# Patient Record
Sex: MALE | Race: ASIAN | NOT HISPANIC OR LATINO | URBAN - METROPOLITAN AREA
[De-identification: names, ages, dates, MRNs, and addresses within clinical notes are randomized per-mention and may not be internally consistent; named-entity substitution may affect disease eponyms.]

---

## 2019-12-02 ENCOUNTER — INPATIENT (INPATIENT)
Facility: HOSPITAL | Age: 64
LOS: 1 days | Discharge: HOME | DRG: 310 | End: 2019-12-04
Attending: INTERNAL MEDICINE | Admitting: INTERNAL MEDICINE
Payer: COMMERCIAL

## 2019-12-02 VITALS
RESPIRATION RATE: 18 BRPM | OXYGEN SATURATION: 97 % | DIASTOLIC BLOOD PRESSURE: 80 MMHG | TEMPERATURE: 98 F | HEART RATE: 93 BPM | SYSTOLIC BLOOD PRESSURE: 132 MMHG | HEIGHT: 66.54 IN | WEIGHT: 154.32 LBS

## 2019-12-02 DIAGNOSIS — I48.91 UNSPECIFIED ATRIAL FIBRILLATION: ICD-10-CM

## 2019-12-02 DIAGNOSIS — E78.5 HYPERLIPIDEMIA, UNSPECIFIED: ICD-10-CM

## 2019-12-02 DIAGNOSIS — I10 ESSENTIAL (PRIMARY) HYPERTENSION: ICD-10-CM

## 2019-12-02 DIAGNOSIS — E11.9 TYPE 2 DIABETES MELLITUS WITHOUT COMPLICATIONS: ICD-10-CM

## 2019-12-02 DIAGNOSIS — R51 HEADACHE: ICD-10-CM

## 2019-12-02 DIAGNOSIS — Z29.9 ENCOUNTER FOR PROPHYLACTIC MEASURES, UNSPECIFIED: ICD-10-CM

## 2019-12-02 DIAGNOSIS — R07.89 OTHER CHEST PAIN: ICD-10-CM

## 2019-12-02 LAB
ALBUMIN SERPL ELPH-MCNC: 4.6 G/DL — SIGNIFICANT CHANGE UP (ref 3.3–5)
ALP SERPL-CCNC: 74 U/L — SIGNIFICANT CHANGE UP (ref 40–120)
ALT FLD-CCNC: 32 U/L — SIGNIFICANT CHANGE UP (ref 10–45)
ANION GAP SERPL CALC-SCNC: 17 MMOL/L — SIGNIFICANT CHANGE UP (ref 5–17)
APTT BLD: 33.7 SEC — SIGNIFICANT CHANGE UP (ref 27.5–36.3)
AST SERPL-CCNC: 17 U/L — SIGNIFICANT CHANGE UP (ref 10–40)
BASOPHILS # BLD AUTO: 0.04 K/UL — SIGNIFICANT CHANGE UP (ref 0–0.2)
BASOPHILS NFR BLD AUTO: 0.3 % — SIGNIFICANT CHANGE UP (ref 0–2)
BILIRUB SERPL-MCNC: 0.5 MG/DL — SIGNIFICANT CHANGE UP (ref 0.2–1.2)
BUN SERPL-MCNC: 14 MG/DL — SIGNIFICANT CHANGE UP (ref 7–23)
CALCIUM SERPL-MCNC: 9.6 MG/DL — SIGNIFICANT CHANGE UP (ref 8.4–10.5)
CHLORIDE SERPL-SCNC: 92 MMOL/L — LOW (ref 96–108)
CO2 SERPL-SCNC: 24 MMOL/L — SIGNIFICANT CHANGE UP (ref 22–31)
CREAT SERPL-MCNC: 0.69 MG/DL — SIGNIFICANT CHANGE UP (ref 0.5–1.3)
EOSINOPHIL # BLD AUTO: 0.05 K/UL — SIGNIFICANT CHANGE UP (ref 0–0.5)
EOSINOPHIL NFR BLD AUTO: 0.4 % — SIGNIFICANT CHANGE UP (ref 0–6)
GAS PNL BLDV: SIGNIFICANT CHANGE UP
GLUCOSE SERPL-MCNC: 402 MG/DL — HIGH (ref 70–99)
HCT VFR BLD CALC: 51.9 % — HIGH (ref 39–50)
HGB BLD-MCNC: 17.4 G/DL — HIGH (ref 13–17)
IMM GRANULOCYTES NFR BLD AUTO: 0.6 % — SIGNIFICANT CHANGE UP (ref 0–1.5)
INR BLD: 0.95 RATIO — SIGNIFICANT CHANGE UP (ref 0.88–1.16)
LYMPHOCYTES # BLD AUTO: 15.4 % — SIGNIFICANT CHANGE UP (ref 13–44)
LYMPHOCYTES # BLD AUTO: 2.11 K/UL — SIGNIFICANT CHANGE UP (ref 1–3.3)
MCHC RBC-ENTMCNC: 28.6 PG — SIGNIFICANT CHANGE UP (ref 27–34)
MCHC RBC-ENTMCNC: 33.5 GM/DL — SIGNIFICANT CHANGE UP (ref 32–36)
MCV RBC AUTO: 85.2 FL — SIGNIFICANT CHANGE UP (ref 80–100)
MONOCYTES # BLD AUTO: 0.88 K/UL — SIGNIFICANT CHANGE UP (ref 0–0.9)
MONOCYTES NFR BLD AUTO: 6.4 % — SIGNIFICANT CHANGE UP (ref 2–14)
NEUTROPHILS # BLD AUTO: 10.55 K/UL — HIGH (ref 1.8–7.4)
NEUTROPHILS NFR BLD AUTO: 76.9 % — SIGNIFICANT CHANGE UP (ref 43–77)
NRBC # BLD: 0 /100 WBCS — SIGNIFICANT CHANGE UP (ref 0–0)
PLATELET # BLD AUTO: 369 K/UL — SIGNIFICANT CHANGE UP (ref 150–400)
POTASSIUM SERPL-MCNC: 4.7 MMOL/L — SIGNIFICANT CHANGE UP (ref 3.5–5.3)
POTASSIUM SERPL-SCNC: 4.7 MMOL/L — SIGNIFICANT CHANGE UP (ref 3.5–5.3)
PROT SERPL-MCNC: 7.9 G/DL — SIGNIFICANT CHANGE UP (ref 6–8.3)
PROTHROM AB SERPL-ACNC: 10.9 SEC — SIGNIFICANT CHANGE UP (ref 10–12.9)
RBC # BLD: 6.09 M/UL — HIGH (ref 4.2–5.8)
RBC # FLD: 13.2 % — SIGNIFICANT CHANGE UP (ref 10.3–14.5)
SODIUM SERPL-SCNC: 133 MMOL/L — LOW (ref 135–145)
TROPONIN T, HIGH SENSITIVITY RESULT: 15 NG/L — SIGNIFICANT CHANGE UP (ref 0–51)
TROPONIN T, HIGH SENSITIVITY RESULT: 27 NG/L — SIGNIFICANT CHANGE UP (ref 0–51)
WBC # BLD: 13.71 K/UL — HIGH (ref 3.8–10.5)
WBC # FLD AUTO: 13.71 K/UL — HIGH (ref 3.8–10.5)

## 2019-12-02 PROCEDURE — 93010 ELECTROCARDIOGRAM REPORT: CPT

## 2019-12-02 PROCEDURE — 70450 CT HEAD/BRAIN W/O DYE: CPT | Mod: 26

## 2019-12-02 PROCEDURE — 99291 CRITICAL CARE FIRST HOUR: CPT

## 2019-12-02 PROCEDURE — 71046 X-RAY EXAM CHEST 2 VIEWS: CPT | Mod: 26

## 2019-12-02 RX ORDER — DEXTROSE 50 % IN WATER 50 %
12.5 SYRINGE (ML) INTRAVENOUS ONCE
Refills: 0 | Status: DISCONTINUED | OUTPATIENT
Start: 2019-12-02 | End: 2019-12-04

## 2019-12-02 RX ORDER — RIVAROXABAN 15 MG-20MG
20 KIT ORAL
Refills: 0 | Status: DISCONTINUED | OUTPATIENT
Start: 2019-12-02 | End: 2019-12-04

## 2019-12-02 RX ORDER — DEXTROSE 50 % IN WATER 50 %
15 SYRINGE (ML) INTRAVENOUS ONCE
Refills: 0 | Status: DISCONTINUED | OUTPATIENT
Start: 2019-12-02 | End: 2019-12-04

## 2019-12-02 RX ORDER — DILTIAZEM HCL 120 MG
10 CAPSULE, EXT RELEASE 24 HR ORAL ONCE
Refills: 0 | Status: COMPLETED | OUTPATIENT
Start: 2019-12-02 | End: 2019-12-02

## 2019-12-02 RX ORDER — METOPROLOL TARTRATE 50 MG
2.5 TABLET ORAL ONCE
Refills: 0 | Status: COMPLETED | OUTPATIENT
Start: 2019-12-02 | End: 2019-12-02

## 2019-12-02 RX ORDER — DEXTROSE 50 % IN WATER 50 %
25 SYRINGE (ML) INTRAVENOUS ONCE
Refills: 0 | Status: DISCONTINUED | OUTPATIENT
Start: 2019-12-02 | End: 2019-12-04

## 2019-12-02 RX ORDER — DIGOXIN 250 MCG
0.25 TABLET ORAL ONCE
Refills: 0 | Status: COMPLETED | OUTPATIENT
Start: 2019-12-03 | End: 2019-12-03

## 2019-12-02 RX ORDER — ATORVASTATIN CALCIUM 80 MG/1
40 TABLET, FILM COATED ORAL AT BEDTIME
Refills: 0 | Status: DISCONTINUED | OUTPATIENT
Start: 2019-12-02 | End: 2019-12-04

## 2019-12-02 RX ORDER — INSULIN LISPRO 100/ML
VIAL (ML) SUBCUTANEOUS AT BEDTIME
Refills: 0 | Status: DISCONTINUED | OUTPATIENT
Start: 2019-12-02 | End: 2019-12-03

## 2019-12-02 RX ORDER — ONDANSETRON 8 MG/1
4 TABLET, FILM COATED ORAL ONCE
Refills: 0 | Status: COMPLETED | OUTPATIENT
Start: 2019-12-02 | End: 2019-12-02

## 2019-12-02 RX ORDER — SODIUM CHLORIDE 9 MG/ML
500 INJECTION INTRAMUSCULAR; INTRAVENOUS; SUBCUTANEOUS ONCE
Refills: 0 | Status: COMPLETED | OUTPATIENT
Start: 2019-12-02 | End: 2019-12-02

## 2019-12-02 RX ORDER — SODIUM CHLORIDE 9 MG/ML
1000 INJECTION, SOLUTION INTRAVENOUS
Refills: 0 | Status: DISCONTINUED | OUTPATIENT
Start: 2019-12-02 | End: 2019-12-04

## 2019-12-02 RX ORDER — PANTOPRAZOLE SODIUM 20 MG/1
40 TABLET, DELAYED RELEASE ORAL
Refills: 0 | Status: DISCONTINUED | OUTPATIENT
Start: 2019-12-02 | End: 2019-12-04

## 2019-12-02 RX ORDER — DIGOXIN 250 MCG
0.5 TABLET ORAL ONCE
Refills: 0 | Status: COMPLETED | OUTPATIENT
Start: 2019-12-02 | End: 2019-12-02

## 2019-12-02 RX ORDER — GLUCAGON INJECTION, SOLUTION 0.5 MG/.1ML
1 INJECTION, SOLUTION SUBCUTANEOUS ONCE
Refills: 0 | Status: DISCONTINUED | OUTPATIENT
Start: 2019-12-02 | End: 2019-12-04

## 2019-12-02 RX ORDER — LOSARTAN POTASSIUM 100 MG/1
100 TABLET, FILM COATED ORAL DAILY
Refills: 0 | Status: DISCONTINUED | OUTPATIENT
Start: 2019-12-02 | End: 2019-12-02

## 2019-12-02 RX ORDER — ATENOLOL 25 MG/1
50 TABLET ORAL DAILY
Refills: 0 | Status: DISCONTINUED | OUTPATIENT
Start: 2019-12-02 | End: 2019-12-04

## 2019-12-02 RX ORDER — DILTIAZEM HCL 120 MG
60 CAPSULE, EXT RELEASE 24 HR ORAL ONCE
Refills: 0 | Status: COMPLETED | OUTPATIENT
Start: 2019-12-02 | End: 2019-12-02

## 2019-12-02 RX ORDER — ASPIRIN/CALCIUM CARB/MAGNESIUM 324 MG
324 TABLET ORAL ONCE
Refills: 0 | Status: DISCONTINUED | OUTPATIENT
Start: 2019-12-02 | End: 2019-12-02

## 2019-12-02 RX ORDER — INSULIN LISPRO 100/ML
VIAL (ML) SUBCUTANEOUS
Refills: 0 | Status: DISCONTINUED | OUTPATIENT
Start: 2019-12-02 | End: 2019-12-03

## 2019-12-02 RX ADMIN — Medication 0.5 MILLIGRAM(S): at 20:38

## 2019-12-02 RX ADMIN — PANTOPRAZOLE SODIUM 40 MILLIGRAM(S): 20 TABLET, DELAYED RELEASE ORAL at 19:26

## 2019-12-02 RX ADMIN — ONDANSETRON 4 MILLIGRAM(S): 8 TABLET, FILM COATED ORAL at 18:26

## 2019-12-02 RX ADMIN — SODIUM CHLORIDE 500 MILLILITER(S): 9 INJECTION INTRAMUSCULAR; INTRAVENOUS; SUBCUTANEOUS at 18:25

## 2019-12-02 RX ADMIN — RIVAROXABAN 20 MILLIGRAM(S): KIT at 19:26

## 2019-12-02 RX ADMIN — Medication 60 MILLIGRAM(S): at 19:26

## 2019-12-02 RX ADMIN — Medication 10: at 20:46

## 2019-12-02 RX ADMIN — Medication 10 MILLIGRAM(S): at 18:26

## 2019-12-02 RX ADMIN — Medication 2.5 MILLIGRAM(S): at 17:20

## 2019-12-02 RX ADMIN — ATENOLOL 50 MILLIGRAM(S): 25 TABLET ORAL at 19:26

## 2019-12-02 NOTE — ED PROVIDER NOTE - NS ED ROS FT
Constitutional: No fever or chills  Eyes: No visual changes, eye pain or redness  HEENT: No throat pain, ear pain, nasal pain. No nose bleeding.  CV: see hpi  Resp: No SOB no cough  GI: No abd pain. No nausea or vomiting. No diarrhea. No constipation.   : No dysuria, hematuria.   MSK: No musculoskeletal pain  Skin: No rash  Neuro: see hpi

## 2019-12-02 NOTE — H&P ADULT - ASSESSMENT
Pt is a 65 y/o male with PMHx of hypertrophic cardiomyopathy S/P defibrillator, DM, HTN, HLD, afib on xarelto presenting with sharp nonradiating chest pain. found to have a fib with RVR

## 2019-12-02 NOTE — ED ADULT NURSE REASSESSMENT NOTE - NS ED NURSE REASSESS COMMENT FT1
Report received from PRIYANK Rivas . Pt AAOx4, NAD, resp nonlabored, skin warm/dry, resting comfortably in bed with family at bedside. Pt currently has no complaints. PT on cardiac monitor, noted to be A-fib . Pt denies headache, dizziness, chest pain, palpitations, SOB, abd pain, n/v/d, urinary symptoms, fevers, chills, weakness at this time. Pt awaiting bed . Safety maintained.

## 2019-12-02 NOTE — ED PROVIDER NOTE - PROGRESS NOTE DETAILS
Spoke with Dr. Mix ([facility]) who states pt was sent in for afib with avr, has known hx of afib, on xarelto, would recommedn admission tospitalist. -LETICIA ThomasC Spoke with Dr. Mix ([facility]) who states pt was sent in for afib with avr, has known hx of afib, on xarelto, would recommend admission to Dr. Peterson, may add admission , do not need to call him as he already spoke with the pt. -Hope Rasmussen PA-C Spoke with Dr. Mix ([facility]) who states pt was sent in for afib with avr, has known hx of afib, on xarelto, would recommend admission to Dr. Hannah, may add admission , do not need to call him as he already spoke with the pt. -Hope Rasmussen PA-C JAMES Mason MD: Spoke with Dr. Taylor re: patient's second troponin going from 15->27; he will address and d/w Dr. Mix

## 2019-12-02 NOTE — H&P ADULT - NSHPREVIEWOFSYSTEMS_GEN_ALL_CORE
REVIEW OF SYSTEMS:    CONSTITUTIONAL: + weakness, headache   EYES/ENT: No visual changes;  No vertigo or throat pain   NECK: No pain or stiffness  RESPIRATORY: No cough, wheezing, hemoptysis; No shortness of breath  CARDIOVASCULAR: No chest pain or palpitations  GASTROINTESTINAL: No abdominal or epigastric pain. No nausea, vomiting, or hematemesis; No diarrhea or constipation. No melena or hematochezia.  GENITOURINARY: No dysuria, frequency or hematuria  NEUROLOGICAL: No numbness or weakness  SKIN: No itching, burning, rashes, or lesions   All other review of systems is negative unless indicated above.

## 2019-12-02 NOTE — ED PROVIDER NOTE - PHYSICAL EXAMINATION
A&Ox3, NAD. NCAT. PERRL, EOMI. Neck supple, no LAD. Lungs CTAB. +S1S2, IRR, No m/r/g. Abd soft, NT/ND, +BS, no rebound or guarding. Extremities: cap refill <2, pulses in distal extremities 4+, no edema. Skin without rash. CN II-XII intact. Strength 5/5 UE/LE. Sensations intact throughout. no pronator drift, point to point intact.

## 2019-12-02 NOTE — H&P ADULT - NSHPLABSRESULTS_GEN_ALL_CORE
17.4   13.71 )-----------( 369      ( 02 Dec 2019 17:13 )             51.9               12-02    133<L>  |  92<L>  |  14  ----------------------------<  402<H>  4.7   |  24  |  0.69    Ca    9.6      02 Dec 2019 17:13    TPro  7.9  /  Alb  4.6  /  TBili  0.5  /  DBili  x   /  AST  17  /  ALT  32  /  AlkPhos  74  12-02    PT/INR - ( 02 Dec 2019 17:13 )   PT: 10.9 sec;   INR: 0.95 ratio         PTT - ( 02 Dec 2019 17:13 )  PTT:33.7 sec 17.4   13.71 )-----------( 369      ( 02 Dec 2019 17:13 )             51.9               12-02    133<L>  |  92<L>  |  14  ----------------------------<  402<H>  4.7   |  24  |  0.69    Ca    9.6      02 Dec 2019 17:13    TPro  7.9  /  Alb  4.6  /  TBili  0.5  /  DBili  x   /  AST  17  /  ALT  32  /  AlkPhos  74  12-02    PT/INR - ( 02 Dec 2019 17:13 )   PT: 10.9 sec;   INR: 0.95 ratio         PTT - ( 02 Dec 2019 17:13 )  PTT:33.7 sec    < from: CT Head No Cont (12.02.19 @ 17:47) >    IMPRESSION:    No acute intracranial hemorrhage, mass effect, vasogenic edema, or   evidence of acute territorial infarct.    A few nonspecific punctate of calcific foci in the left parietal lobe.   Findings could represent the presence of healed neurocysticercosis or be   the sequelae of prior infectious/inflammatory process.

## 2019-12-02 NOTE — H&P ADULT - PROBLEM SELECTOR PLAN 1
tele monitoring   S/P Cardizem with no improvement  DIg load with standing afterwards  monitor BP, on low side   S/P IV hydration   cont Xorelto for AC

## 2019-12-02 NOTE — H&P ADULT - PROBLEM SELECTOR PLAN 3
CT head noted  improved headache  Neuro eval for CT findings   Hx of CVA in the past  no known intracranial pathology as per pt

## 2019-12-02 NOTE — H&P ADULT - NSHPPHYSICALEXAM_GEN_ALL_CORE
Vital Signs Last 24 Hrs  T(C): 36.5 (02 Dec 2019 15:19), Max: 36.5 (02 Dec 2019 15:19)  T(F): 97.7 (02 Dec 2019 15:19), Max: 97.7 (02 Dec 2019 15:19)  HR: 103 (02 Dec 2019 17:24) (93 - 136)  BP: 105/72 (02 Dec 2019 17:24) (105/72 - 132/80)  BP(mean): --  RR: 18 (02 Dec 2019 17:24) (18 - 18)  SpO2: 97% (02 Dec 2019 17:24) (96% - 98%) Vital Signs Last 24 Hrs  T(C): 36.5 (02 Dec 2019 15:19), Max: 36.5 (02 Dec 2019 15:19)  T(F): 97.7 (02 Dec 2019 15:19), Max: 97.7 (02 Dec 2019 15:19)  HR: 103 (02 Dec 2019 17:24) (93 - 136)  BP: 105/72 (02 Dec 2019 17:24) (105/72 - 132/80)  BP(mean): --  RR: 18 (02 Dec 2019 17:24) (18 - 18)  SpO2: 97% (02 Dec 2019 17:24) (96% - 98%)    Appearance: Normal	  HEENT:   Normal oral mucosa, PERRL, EOMI	  Lymphatic: No lymphadenopathy , no edema  Cardiovascular: S1S2, irregularly irregular   Respiratory: Lungs clear to auscultation, normal effort 	  Gastrointestinal:  Soft, Non-tender, + BS	  Skin: No rashes, No ecchymoses, No cyanosis, warm to touch  Musculoskeletal: Normal range of motion, normal strength  Psychiatry:  Mood & affect appropriate  Ext: No edema

## 2019-12-02 NOTE — ED PROVIDER NOTE - OBJECTIVE STATEMENT
63 yo male with defibrillator, DM, htn, hld, presenting with sharp nonradiating chest pain since this morning which awoke him from his sleep, is constant non exertional, non pleuritic, no changes with positions. Pt endorses associated nausea and diaphoresis and shortness of breath. Pt also c/o sharp, vice-like bitemproal headache which also started this morning when he woke up this morning and has been getting worse in intensity. denies fevers, chills, changes in vision, abdominal pain, difficulty breahting. PTs daughter called pts cardiologist. Garret Yates who recommended he go to the ED, reviewed his loop recorder (per family unknown why he has it) and was told he has an arrythmia and should come to the ED. 65 yo male with defibrillator, DM, htn, hld, presenting with sharp nonradiating chest pain since this morning which awoke him from his sleep, is constant non exertional, non pleuritic, no changes with positions. Pt endorses associated nausea and diaphoresis and shortness of breath. Pt also c/o sharp, vice-like bitemproal headache which also started this morning when he woke up this morning and has been getting worse in intensity. denies fevers, chills, changes in vision, abdominal pain, difficulty breahting. PTs daughter called pts cardiologist. Gokul Yates who recommended he go to the ED, reviewed his loop recorder (per family unknown why he has it) and was told he has an arrythmia and should come to the ED.    PMD; Dr. Corby Brady--uses unattached hospitalist 63 yo male with defibrillator, DM, htn, hld, afib on xarelto presenting with sharp nonradiating chest pain since this morning which awoke him from his sleep, is constant non exertional, non pleuritic, no changes with positions. Pt endorses associated nausea and diaphoresis and shortness of breath. Pt also c/o sharp, vice-like bitemproal headache which also started this morning when he woke up this morning and has been getting worse in intensity. denies fevers, chills, changes in vision, abdominal pain, difficulty breahting. PTs daughter called pts cardiologist. Gokul Yates who recommended he go to the ED, reviewed his loop recorder (per family unknown why he has it) and was told he has an arrythmia and should come to the ED.    PMD; Dr. Corby Brady--uses unattached hospitalist

## 2019-12-02 NOTE — CONSULT NOTE ADULT - SUBJECTIVE AND OBJECTIVE BOX
CHIEF COMPLAINT:Patient is a 64y old  Male who presents with a chief complaint of Afib with RVR (02 Dec 2019 18:12)      HISTORY OF PRESENT ILLNESS:HPI:  Pt is a 65 y/o male with PMHx of hypertrophic cardiomyopathy S/P defibrillator, DM, HTN, HLD, afib on xarelto presenting with sharp nonradiating chest pain since this morning which awoke him from his sleep, is constant non exertional, non pleuritic, no changes with positions. Pt endorses associated nausea and diaphoresis and shortness of breath. Pt also c/o sharp, vice-like bitemproal headache which also started this morning when he woke up this morning and has been getting worse in intensity. denies fevers, chills, changes in vision, abdominal pain, difficulty breahting. PTs daughter called pts cardiologist. Gokul Yates who recommended he go to the ED, reviewed his loop recorder (per family unknown why he has it) and was told he has an arrythmia and should come to the ED. found to have A fib with RVR (02 Dec 2019 18:12)      PAST MEDICAL & SURGICAL HISTORY:  No pertinent past medical history  No significant past surgical history          MEDICATIONS:  ATENolol  Tablet 50 milliGRAM(s) Oral daily  rivaroxaban 20 milliGRAM(s) Oral with dinner          pantoprazole    Tablet 40 milliGRAM(s) Oral before breakfast    atorvastatin 40 milliGRAM(s) Oral at bedtime  dextrose 40% Gel 15 Gram(s) Oral once PRN  dextrose 50% Injectable 12.5 Gram(s) IV Push once  dextrose 50% Injectable 25 Gram(s) IV Push once  dextrose 50% Injectable 25 Gram(s) IV Push once  glucagon  Injectable 1 milliGRAM(s) IntraMuscular once PRN  insulin lispro (HumaLOG) corrective regimen sliding scale   SubCutaneous three times a day before meals  insulin lispro (HumaLOG) corrective regimen sliding scale   SubCutaneous at bedtime    dextrose 5%. 1000 milliLiter(s) IV Continuous <Continuous>      FAMILY HISTORY:      Non-contributory    SOCIAL HISTORY:    not a smoker    Allergies    No Known Allergies    Intolerances    	    REVIEW OF SYSTEMS:  CONSTITUTIONAL: No fever  EYES: No eye pain, visual disturbances, or discharge  ENMT:  No difficulty hearing, tinnitus  NECK: No pain or stiffness  RESPIRATORY: No cough, wheezing,  CARDIOVASCULAR: + chest pain, palpitations, no passing out, dizziness, or leg swelling  GASTROINTESTINAL:  No nausea, vomiting, diarrhea or constipation. No melena.  GENITOURINARY: No dysuria, hematuria  NEUROLOGICAL: No stroke like symptoms  SKIN: No burning or lesions   ENDOCRINE: No heat or cold intolerance  MUSCULOSKELETAL: No joint pain or swelling  PSYCHIATRIC: No  anxiety, mood swings  HEME/LYMPH: No bleeding gums  ALLERGY AND IMMUNOLOGIC: No hives or eczema	    All other ROS negative    PHYSICAL EXAM:  T(C): 36.7 (12-02-19 @ 19:28), Max: 36.7 (12-02-19 @ 19:28)  HR: 88 (12-02-19 @ 20:40) (88 - 136)  BP: 108/65 (12-02-19 @ 20:40) (103/71 - 132/80)  RR: 17 (12-02-19 @ 20:40) (17 - 18)  SpO2: 96% (12-02-19 @ 20:40) (95% - 98%)  Wt(kg): --  I&O's Summary      Appearance: Normal	  HEENT:   Normal oral mucosa, EOMI	  Cardiovascular: Normal S1 S2, No JVD, No murmurs  Respiratory: Lungs clear to auscultation	  Psychiatry: Alert  Gastrointestinal:  Soft, Non-tender, + BS	  Skin: No rashes   Neurologic: Non-focal  Extremities:  No edema  Vascular: Peripheral pulses palpable    	    	  	  CARDIAC MARKERS:  Labs personally reviewed by me                                  17.4   13.71 )-----------( 369      ( 02 Dec 2019 17:13 )             51.9     12-02    133<L>  |  92<L>  |  14  ----------------------------<  402<H>  4.7   |  24  |  0.69    Ca    9.6      02 Dec 2019 17:13    TPro  7.9  /  Alb  4.6  /  TBili  0.5  /  DBili  x   /  AST  17  /  ALT  32  /  AlkPhos  74  12-02          EKG: Personally reviewed by me - af rvr, clear lungs  Radiology: Personally reviewed by me - cxr clear lungs      Assessment and Plan:   Assessment:  · Assessment		  Pt is a 65 y/o male with PMHx of hypertrophic cardiomyopathy S/P defibrillator, DM, HTN, HLD, afib on xarelto presenting with sharp nonradiating chest pain. found to have a fib with RVR      Problem/Plan - 1:  ·  Problem: Atrial fibrillation with RVR.  Plan: tele monitoring   S/P Cardizem with no improvement  DIg load as BP soft and not able to tolerate increasing AV judy blockers  Resume home meds Atenolol and Cardizem 120mg  S/P IV hydration   cont Xorelto for AC.     Problem/Plan - 2:  ·  Problem: Chest pain, atypical.  Plan: serial CE and EKG  mild elevation in tryptonemia due to A fib with RVR  TTE    Problem/Plan - 3:  ·  Problem: Headache.  Plan: CT head noted  improved headache  Neuro eval for CT findings   Hx of CVA in the past  no known intracranial pathology as per pt.     Problem/Plan - 4:  ·  Problem: HTN (hypertension).  Plan: hold ARBs in view of low BP  may restart when HR stable   monitor vitals.     Problem/Plan - 5:  ·  Problem: HLD (hyperlipidemia).  Plan: lipid panel  statin.     Problem/Plan - 6:  Problem: Diabetes. Plan: sliding scale  hold oral mes  check A1C.    Problem/Plan - 7:  ·  Problem: Prophylactic measure.  Plan: DVT and GI PPX.       Advanced care planning was discussed with patient and family.  Advanced care planning were reviewed and discussed.  Differential diagnosis and plan of care discussed with patient after the evaluation.   Counseling on Diet, exercise, and medication compliance was done.     Girma Mix DO Located within [facility]  Cardiovascular Medicine  [phone]

## 2019-12-02 NOTE — ED PROVIDER NOTE - CLINICAL SUMMARY MEDICAL DECISION MAKING FREE TEXT BOX
JAMES Mason MD: Pt is a 63 y/o male with PMH defibrillator, DM, htn, hld, afib on xarelto who p/w c/o sharp nonradiating chest pain since this morning which awoke him from his sleep, is constant non exertional, non pleuritic, no changes with positions. Pt endorses associated nausea and diaphoresis and shortness of breath. Pt also c/o sharp, vice-like bitemproal headache which also started this morning when he woke up this morning and has been getting worse in intensity. denies fevers, chills, changes in vision, abdominal pain, difficulty breahting. PTs daughter called pts cardiologist. Gokul Yates who recommended he go to the ED, reviewed his loop recorder (per family unknown why he has it) and was told he has an arrythmia and should come to the ED. Ddx includes, however, is not limited to: ACS, MSK pain, metabolic d/o, electrolyte abnormality, other. Plan: basic labs, ekg, trop, bnp, rate control, TBA

## 2019-12-02 NOTE — H&P ADULT - HISTORY OF PRESENT ILLNESS
Pt is a 63 y/o male with PMHx of hypertrophic cardiomyopathy S/P defibrillator, DM, HTN, HLD, afib on xarelto presenting with sharp nonradiating chest pain since this morning which awoke him from his sleep, is constant non exertional, non pleuritic, no changes with positions. Pt endorses associated nausea and diaphoresis and shortness of breath. Pt also c/o sharp, vice-like bitemproal headache which also started this morning when he woke up this morning and has been getting worse in intensity. denies fevers, chills, changes in vision, abdominal pain, difficulty breahting. PTs daughter called pts cardiologist. Gokul Yates who recommended he go to the ED, reviewed his loop recorder (per family unknown why he has it) and was told he has an arrythmia and should come to the ED. found to have A fib with RVR

## 2019-12-03 LAB
ALBUMIN SERPL ELPH-MCNC: 3.9 G/DL — SIGNIFICANT CHANGE UP (ref 3.3–5)
ALP SERPL-CCNC: 60 U/L — SIGNIFICANT CHANGE UP (ref 40–120)
ALT FLD-CCNC: 28 U/L — SIGNIFICANT CHANGE UP (ref 10–45)
ANION GAP SERPL CALC-SCNC: 12 MMOL/L — SIGNIFICANT CHANGE UP (ref 5–17)
AST SERPL-CCNC: 27 U/L — SIGNIFICANT CHANGE UP (ref 10–40)
BILIRUB SERPL-MCNC: 0.3 MG/DL — SIGNIFICANT CHANGE UP (ref 0.2–1.2)
BUN SERPL-MCNC: 18 MG/DL — SIGNIFICANT CHANGE UP (ref 7–23)
CALCIUM SERPL-MCNC: 8.8 MG/DL — SIGNIFICANT CHANGE UP (ref 8.4–10.5)
CHLORIDE SERPL-SCNC: 97 MMOL/L — SIGNIFICANT CHANGE UP (ref 96–108)
CHOLEST SERPL-MCNC: 189 MG/DL — SIGNIFICANT CHANGE UP (ref 10–199)
CO2 SERPL-SCNC: 24 MMOL/L — SIGNIFICANT CHANGE UP (ref 22–31)
CREAT SERPL-MCNC: 0.65 MG/DL — SIGNIFICANT CHANGE UP (ref 0.5–1.3)
GLUCOSE SERPL-MCNC: 231 MG/DL — HIGH (ref 70–99)
HBA1C BLD-MCNC: 11.2 % — HIGH (ref 4–5.6)
HCT VFR BLD CALC: 45.7 % — SIGNIFICANT CHANGE UP (ref 39–50)
HCV AB S/CO SERPL IA: 0.34 S/CO — SIGNIFICANT CHANGE UP (ref 0–0.99)
HCV AB SERPL-IMP: SIGNIFICANT CHANGE UP
HDLC SERPL-MCNC: 27 MG/DL — LOW
HGB BLD-MCNC: 15.1 G/DL — SIGNIFICANT CHANGE UP (ref 13–17)
LIPID PNL WITH DIRECT LDL SERPL: SIGNIFICANT CHANGE UP MG/DL
MCHC RBC-ENTMCNC: 28.5 PG — SIGNIFICANT CHANGE UP (ref 27–34)
MCHC RBC-ENTMCNC: 33 GM/DL — SIGNIFICANT CHANGE UP (ref 32–36)
MCV RBC AUTO: 86.2 FL — SIGNIFICANT CHANGE UP (ref 80–100)
PLATELET # BLD AUTO: 317 K/UL — SIGNIFICANT CHANGE UP (ref 150–400)
POTASSIUM SERPL-MCNC: 4.1 MMOL/L — SIGNIFICANT CHANGE UP (ref 3.5–5.3)
POTASSIUM SERPL-SCNC: 4.1 MMOL/L — SIGNIFICANT CHANGE UP (ref 3.5–5.3)
PROT SERPL-MCNC: 6.6 G/DL — SIGNIFICANT CHANGE UP (ref 6–8.3)
RBC # BLD: 5.3 M/UL — SIGNIFICANT CHANGE UP (ref 4.2–5.8)
RBC # FLD: 13.3 % — SIGNIFICANT CHANGE UP (ref 10.3–14.5)
SODIUM SERPL-SCNC: 133 MMOL/L — LOW (ref 135–145)
TOTAL CHOLESTEROL/HDL RATIO MEASUREMENT: 7 RATIO — SIGNIFICANT CHANGE UP (ref 3.4–9.6)
TRIGL SERPL-MCNC: 694 MG/DL — HIGH (ref 10–149)
TSH SERPL-MCNC: 2.32 UIU/ML — SIGNIFICANT CHANGE UP (ref 0.27–4.2)
WBC # BLD: 10.34 K/UL — SIGNIFICANT CHANGE UP (ref 3.8–10.5)
WBC # FLD AUTO: 10.34 K/UL — SIGNIFICANT CHANGE UP (ref 3.8–10.5)

## 2019-12-03 RX ORDER — INSULIN LISPRO 100/ML
VIAL (ML) SUBCUTANEOUS
Refills: 0 | Status: DISCONTINUED | OUTPATIENT
Start: 2019-12-03 | End: 2019-12-04

## 2019-12-03 RX ORDER — INSULIN LISPRO 100/ML
VIAL (ML) SUBCUTANEOUS AT BEDTIME
Refills: 0 | Status: DISCONTINUED | OUTPATIENT
Start: 2019-12-03 | End: 2019-12-04

## 2019-12-03 RX ORDER — INFLUENZA VIRUS VACCINE 15; 15; 15; 15 UG/.5ML; UG/.5ML; UG/.5ML; UG/.5ML
0.5 SUSPENSION INTRAMUSCULAR ONCE
Refills: 0 | Status: DISCONTINUED | OUTPATIENT
Start: 2019-12-03 | End: 2019-12-04

## 2019-12-03 RX ADMIN — Medication 8: at 08:18

## 2019-12-03 RX ADMIN — Medication 0.25 MILLIGRAM(S): at 02:34

## 2019-12-03 RX ADMIN — Medication 4: at 18:00

## 2019-12-03 RX ADMIN — Medication 6: at 12:22

## 2019-12-03 RX ADMIN — PANTOPRAZOLE SODIUM 40 MILLIGRAM(S): 20 TABLET, DELAYED RELEASE ORAL at 05:58

## 2019-12-03 RX ADMIN — ATENOLOL 50 MILLIGRAM(S): 25 TABLET ORAL at 05:58

## 2019-12-03 RX ADMIN — Medication 0.25 MILLIGRAM(S): at 08:19

## 2019-12-03 RX ADMIN — ATORVASTATIN CALCIUM 40 MILLIGRAM(S): 80 TABLET, FILM COATED ORAL at 00:42

## 2019-12-03 RX ADMIN — RIVAROXABAN 20 MILLIGRAM(S): KIT at 18:01

## 2019-12-03 RX ADMIN — ATORVASTATIN CALCIUM 40 MILLIGRAM(S): 80 TABLET, FILM COATED ORAL at 21:00

## 2019-12-03 NOTE — PROGRESS NOTE ADULT - ASSESSMENT
Pt is a 63 y/o male with PMHx of hypertrophic cardiomyopathy S/P defibrillator, DM, HTN, HLD, afib on xarelto presenting with sharp nonradiating chest pain. found to have a fib with RVR

## 2019-12-03 NOTE — CONSULT NOTE ADULT - SUBJECTIVE AND OBJECTIVE BOX
[facility]), [facility]        Patient is a 64y old  Male who presents with a chief complaint of Afib with RVR (03 Dec 2019 14:38)    Excerpt from H&P,Pt is a 63 y/o male with PMHx of hypertrophic cardiomyopathy S/P defibrillator, DM, HTN, HLD, afib on xarelto presenting with sharp nonradiating chest pain since this morning which awoke him from his sleep, is constant non exertional, non pleuritic, no changes with positions. Pt endorses associated nausea and diaphoresis and shortness of breath. Pt also c/o sharp, vice-like bitemproal headache which also started this morning when he woke up this morning and has been getting worse in intensity. denies fevers, chills, changes in vision, abdominal pain, difficulty breahting. PTs daughter called pts cardiologist. Gokul Yates who recommended he go to the ED, reviewed his loop recorder (per family unknown why he has it) and was told he has an arrythmia and should come to the ED. found to have A fib with RVR (02 Dec 2019 18:12)           *****PAST MEDICAL / Surgical  HISTORY:  PAST MEDICAL & SURGICAL HISTORY:  No pertinent past medical history  No significant past surgical history           *****FAMILY HISTORY:  FAMILY HISTORY:           *****SOCIAL HISTORY:  Alcohol: None  Smoking: None         *****ALLERGIES:   Allergies    No Known Allergies    Intolerances             *****MEDICATIONS: current medication reviewed and documented.   MEDICATIONS  (STANDING):  ATENolol  Tablet 50 milliGRAM(s) Oral daily  atorvastatin 40 milliGRAM(s) Oral at bedtime  dextrose 5%. 1000 milliLiter(s) (50 mL/Hr) IV Continuous <Continuous>  dextrose 50% Injectable 12.5 Gram(s) IV Push once  dextrose 50% Injectable 25 Gram(s) IV Push once  dextrose 50% Injectable 25 Gram(s) IV Push once  influenza   Vaccine 0.5 milliLiter(s) IntraMuscular once  insulin lispro (HumaLOG) corrective regimen sliding scale   SubCutaneous three times a day before meals  insulin lispro (HumaLOG) corrective regimen sliding scale   SubCutaneous at bedtime  pantoprazole    Tablet 40 milliGRAM(s) Oral before breakfast  rivaroxaban 20 milliGRAM(s) Oral with dinner    MEDICATIONS  (PRN):  dextrose 40% Gel 15 Gram(s) Oral once PRN Blood Glucose LESS THAN 70 milliGRAM(s)/deciliter  glucagon  Injectable 1 milliGRAM(s) IntraMuscular once PRN Glucose LESS THAN 70 milligrams/deciliter           *****REVIEW OF SYSTEM:  GEN: no fever, no chills, no pain  RESP: no SOB, no cough, no sputum  CVS: no chest pain, no palpitations, no edema  GI: no abdominal pain, no nausea, no vomiting, no constipation, no diarrhea  : no dysurea, no frequency, no hematurea  Neuro: no headache, no dizziness  PSYCH: no anxiety, no depression  Derm : no itching, no rash         *****VITAL SIGNS:  T(C): 36.3 (12-03-19 @ 11:59), Max: 36.7 (12-02-19 @ 19:28)  HR: 64 (12-03-19 @ 11:59) (64 - 107)  BP: 133/84 (12-03-19 @ 11:59) (105/68 - 133/84)  RR: 18 (12-03-19 @ 11:59) (17 - 18)  SpO2: 96% (12-03-19 @ 11:59) (96% - 97%)  Wt(kg): --    12-03 @ 07:01  -  12-03 @ 18:58  --------------------------------------------------------  IN: 720 mL / OUT: 0 mL / NET: 720 mL             *****PHYSICAL EXAM:   Alert oriented x 3  Attention comprehension are fair.   Able to name, repeat, without any difficulty.   Able to follow 1 step commands.     EOMI fundi not visualized,    No facial asymmetry   Tongue is midline   Palate elevates symmetrically   Moving all 4 ext symmetrically     Gait : not assessed.  B/L down going toes               *****LAB AND IMAGING:                          15.1   10.34 )-----------( 317      ( 03 Dec 2019 09:36 )             45.7               12-03    133<L>  |  97  |  18  ----------------------------<  231<H>  4.1   |  24  |  0.65    Ca    8.8      03 Dec 2019 05:43    TPro  6.6  /  Alb  3.9  /  TBili  0.3  /  DBili  x   /  AST  27  /  ALT  28  /  AlkPhos  60  12-03    PT/INR - ( 02 Dec 2019 17:13 )   PT: 10.9 sec;   INR: 0.95 ratio         PTT - ( 02 Dec 2019 17:13 )  PTT:33.7 sec                          < from: CT Head No Cont (12.02.19 @ 17:47) >    No acute intracranial hemorrhage, mass effect, vasogenic edema, or   evidence of acute territorial infarct.    A few nonspecific punctate of calcific foci in the left parietal lobe.   Findings could represent the presence of healed neurocysticercosis or be   the sequelae of prior infectious/inflammatory process.      < end of copied text >    [All pertinent recent Imaging reports reviewed]         *****A S S E S S M E N T   A N D   P L A N :     Excerpt from H&P,Pt is a 63 y/o male with PMHx of hypertrophic cardiomyopathy S/P defibrillator, DM, HTN, HLD, afib on xarelto presenting with sharp nonradiating chest pain since this morning which awoke him from his sleep, is constant non exertional, non pleuritic, no changes with positions. Pt endorses associated nausea and diaphoresis and shortness of breath. Pt also c/o sharp, vice-like bitemproal headache which also started this morning when he woke up this morning and has been getting worse in intensity. denies fevers, chills, changes in vision, abdominal pain, difficulty breahting. PTs daughter called pts cardiologist. Gokul Yates who recommended he go to the ED, reviewed his loop recorder (per family unknown why he has it) and was told he has an arrythmia and should come to the ED. found to have A fib with RVR      Problem/Recommendations 1: head ct abn   likely indicative of a remote infection, possibly neurocysticercosis, vs. torch infection.  pt with hx of tia in 2007   no hx of seizure.  will continue to monitor        ___________________________  Will follow with you.  Thank you,  Catrachita Tolliver MD  Diplomate of the American Board of Neurology and Psychiatry.  Diplomate of the American Board of Vascular Neurology.   [facility] [place] ([facility]), [facility]   Ph: [phone]    Differential diagnosis and plan of care discussed with patient after the evaluation.   Advanced care planning options discussed.   Pain assessed and judicious use of narcotics when appropriate was discussed.  Importance of Fall prevention discussed.  Counseling on Smoking and Alcohol cessation was offered when appropriate.  Counseling on Diet, exercise, and medication compliance was done.   83 minutes spent on the total encounter;  more than 50 % of the visit was spent on counseling  and or coordinating care by the attending physician.    Thank you for allowing me to participate in the care of this anthony patient. Please do not hesitate to call me if you have any questions.     This and subsequent notes were partially created using voice recognition software and will  inherently be subject to errors including those of syntax and sound alike substitutions which may escape proofreading. In such instances original meaning may be extrapolated by contextual derivation.

## 2019-12-03 NOTE — PROGRESS NOTE ADULT - PROBLEM SELECTOR PLAN 3
CT head noted  improved headache  Neuro eval appreciated   Hx of CVA in the past  no known intracranial pathology as per pt

## 2019-12-03 NOTE — CONSULT NOTE ADULT - PROBLEM SELECTOR RECOMMENDATION 9
Will increase Humalog correction scale coverage to be moderate-scale.  Will continue monitoring FS, log, and FU.  DC plan:  -Continue home dose Janumet 50/1000 BID  -Increase Glimepiride to 4mg BID  -Close FU outpatient endo in 1-2 weeks; Patient is a candidate for insulin tx.  -Patient counseled for compliance with consistent low carb diet and exercise as tolerated outpatient. DC plan:  -Continue home dose Janumet 50/1000 BID  -Increase Glimepiride to 4mg BID  -Close FU outpatient endo in 1-2 weeks; Patient is a candidate for insulin tx.  -Patient counseled for compliance with consistent low carb diet and exercise as tolerated outpatient.

## 2019-12-03 NOTE — CONSULT NOTE ADULT - SUBJECTIVE AND OBJECTIVE BOX
HPI:  Pt is a 65 y/o male with PMHx of hypertrophic cardiomyopathy S/P defibrillator, DM, HTN, HLD, afib on xarelto presenting with sharp nonradiating chest pain since this morning which awoke him from his sleep, is constant non exertional, non pleuritic, no changes with positions. Pt endorses associated nausea and diaphoresis and shortness of breath. Pt also c/o sharp, vice-like bitemproal headache which also started this morning when he woke up this morning and has been getting worse in intensity. denies fevers, chills, changes in vision, abdominal pain, difficulty breahting. PTs daughter called pts cardiologist. Gokul Yates who recommended he go to the ED, reviewed his loop recorder (per family unknown why he has it) and was told he has an arrythmia and should come to the ED. found to have A fib with RVR (02 Dec 2019 18:12)    Patient speaks Setswana;  service used #[id number]  Patient has history of diabetes, A1C 11.2%, on oral meds at home, patient and wife are unsure of medications and doses. As per chart: Glimepiride 2mg BID, Janumet 50/1000 BID. Patient does not check his blood sugars at home. Patient follows up with PCP for diabetes management.  Endo was consulted for glycemic control/input prior to discharge.    PAST MEDICAL & SURGICAL HISTORY:  No pertinent past medical history  No significant past surgical history      FAMILY HISTORY:      Social History:    Outpatient Medications:    MEDICATIONS  (STANDING):  ATENolol  Tablet 50 milliGRAM(s) Oral daily  atorvastatin 40 milliGRAM(s) Oral at bedtime  dextrose 5%. 1000 milliLiter(s) (50 mL/Hr) IV Continuous <Continuous>  dextrose 50% Injectable 12.5 Gram(s) IV Push once  dextrose 50% Injectable 25 Gram(s) IV Push once  dextrose 50% Injectable 25 Gram(s) IV Push once  influenza   Vaccine 0.5 milliLiter(s) IntraMuscular once  insulin lispro (HumaLOG) corrective regimen sliding scale   SubCutaneous at bedtime  insulin lispro (HumaLOG) corrective regimen sliding scale   SubCutaneous three times a day before meals  pantoprazole    Tablet 40 milliGRAM(s) Oral before breakfast  rivaroxaban 20 milliGRAM(s) Oral with dinner    MEDICATIONS  (PRN):  dextrose 40% Gel 15 Gram(s) Oral once PRN Blood Glucose LESS THAN 70 milliGRAM(s)/deciliter  glucagon  Injectable 1 milliGRAM(s) IntraMuscular once PRN Glucose LESS THAN 70 milligrams/deciliter      Allergies    No Known Allergies    Intolerances      Review of Systems:  Constitutional: No fever, no chills  Eyes: No blurry vision  Neuro: No tremors  HEENT: No pain, no neck swelling  Cardiovascular: No chest pain, no palpitations  Respiratory: Has SOB, no cough  GI: No nausea, vomiting, abdominal pain  : No dysuria  Skin: no rash  MSK: Has leg swelling.  Psych: no depression  Endocrine: no polyuria, polydipsia    ALL OTHER SYSTEMS REVIEWED AND NEGATIVE    UNABLE TO OBTAIN    PHYSICAL EXAM:  VITALS: T(C): 36.3 (12-03-19 @ 11:59)  T(F): 97.4 (12-03-19 @ 11:59), Max: 98.1 (12-02-19 @ 19:28)  HR: 64 (12-03-19 @ 11:59) (64 - 136)  BP: 133/84 (12-03-19 @ 11:59) (103/71 - 133/84)  RR:  (17 - 18)  SpO2:  (95% - 98%)  Wt(kg): --  GENERAL: NAD, well-groomed, well-developed  EYES: No proptosis, no lid lag  HEENT:  Atraumatic, Normocephalic  THYROID: Normal size, no palpable nodules  RESPIRATORY: Clear to auscultation bilaterally; No rales, rhonchi, wheezing  CARDIOVASCULAR: Si S2, No murmurs;  GI: Soft, non distended, normal bowel sounds  SKIN: Dry, intact, No rashes or lesions  MUSCULOSKELETAL: Has BL lower extremity edema.  NEURO:  no tremor, sensation decreased in feet BL,    POCT Blood Glucose.: 270 mg/dL (12-03-19 @ 12:01)  POCT Blood Glucose.: 345 mg/dL (12-03-19 @ 08:08)  POCT Blood Glucose.: 367 mg/dL (12-02-19 @ 20:45)                            15.1   10.34 )-----------( 317      ( 03 Dec 2019 09:36 )             45.7       12-03    133<L>  |  97  |  18  ----------------------------<  231<H>  4.1   |  24  |  0.65    EGFR if : 121  EGFR if non : 104    Ca    8.8      12-03    TPro  6.6  /  Alb  3.9  /  TBili  0.3  /  DBili  x   /  AST  27  /  ALT  28  /  AlkPhos  60  12-03      Thyroid Function Tests:  12-03 @ 09:10 TSH 2.32 FreeT4 -- T3 -- Anti TPO -- Anti Thyroglobulin Ab -- TSI --      Hemoglobin A1C, Whole Blood: 11.2 % <H> [4.0 - 5.6] (12-03-19 @ 09:36)      12-03 Chol 189 LDL Unable to calculate Test Repeated  LDL Cholesterol (mg/dL) --- Interpretive Comment (for adults 18 and over)  Optimal LDL Level may vary based on clinical situation  Below 70                   Ideal for people at very high risk of heart  disease  Below 100                  Ideal for people at risk of heart disease  100 - 129                    [place]  130 - 159                    Borderline high  160 - 189                    High  190 and Above           Very high HDL 27<L> Trig 694<H>    Radiology: HPI:  Pt is a 63 y/o male with PMHx of hypertrophic cardiomyopathy S/P defibrillator, DM, HTN, HLD, afib on xarelto presenting with sharp nonradiating chest pain since this morning which awoke him from his sleep, is constant non exertional, non pleuritic, no changes with positions. Pt endorses associated nausea and diaphoresis and shortness of breath. Pt also c/o sharp, vice-like bitemproal headache which also started this morning when he woke up this morning and has been getting worse in intensity. denies fevers, chills, changes in vision, abdominal pain, difficulty breahting. PTs daughter called pts cardiologist. Gokul Yates who recommended he go to the ED, reviewed his loop recorder (per family unknown why he has it) and was told he has an arrythmia and should come to the ED. found to have A fib with RVR (02 Dec 2019 18:12)    Patient speaks English;  service used #[id number]  Patient has history of diabetes, A1C 11.2%, on oral meds at home, patient and wife are unsure of medications and doses. As per chart: Glimepiride 2mg BID, Janumet 50/1000 BID. Patient does not check his blood sugars at home. Patient follows up with PCP for diabetes management.  Endo was consulted for glycemic control/input prior to discharge.    PAST MEDICAL & SURGICAL HISTORY:  No pertinent past medical history  No significant past surgical history      FAMILY HISTORY:      Social History:    Outpatient Medications:    MEDICATIONS  (STANDING):  ATENolol  Tablet 50 milliGRAM(s) Oral daily  atorvastatin 40 milliGRAM(s) Oral at bedtime  dextrose 5%. 1000 milliLiter(s) (50 mL/Hr) IV Continuous <Continuous>  dextrose 50% Injectable 12.5 Gram(s) IV Push once  dextrose 50% Injectable 25 Gram(s) IV Push once  dextrose 50% Injectable 25 Gram(s) IV Push once  influenza   Vaccine 0.5 milliLiter(s) IntraMuscular once  insulin lispro (HumaLOG) corrective regimen sliding scale   SubCutaneous at bedtime  insulin lispro (HumaLOG) corrective regimen sliding scale   SubCutaneous three times a day before meals  pantoprazole    Tablet 40 milliGRAM(s) Oral before breakfast  rivaroxaban 20 milliGRAM(s) Oral with dinner    MEDICATIONS  (PRN):  dextrose 40% Gel 15 Gram(s) Oral once PRN Blood Glucose LESS THAN 70 milliGRAM(s)/deciliter  glucagon  Injectable 1 milliGRAM(s) IntraMuscular once PRN Glucose LESS THAN 70 milligrams/deciliter      Allergies    No Known Allergies    Intolerances      Review of Systems:  Constitutional: No fever, no chills  Eyes: No blurry vision  Neuro: No tremors  HEENT: No pain, no neck swelling  Cardiovascular: No chest pain, no palpitations  Respiratory: Has SOB, no cough  GI: No nausea, vomiting, abdominal pain  : No dysuria  Skin: no rash  MSK: Has leg swelling.  Psych: no depression  Endocrine: no polyuria, polydipsia    ALL OTHER SYSTEMS REVIEWED AND NEGATIVE    UNABLE TO OBTAIN    PHYSICAL EXAM:  VITALS: T(C): 36.3 (12-03-19 @ 11:59)  T(F): 97.4 (12-03-19 @ 11:59), Max: 98.1 (12-02-19 @ 19:28)  HR: 64 (12-03-19 @ 11:59) (64 - 136)  BP: 133/84 (12-03-19 @ 11:59) (103/71 - 133/84)  RR:  (17 - 18)  SpO2:  (95% - 98%)  Wt(kg): --  GENERAL: NAD, well-groomed, well-developed  EYES: No proptosis, no lid lag  HEENT:  Atraumatic, Normocephalic  THYROID: Normal size, no palpable nodules  RESPIRATORY: Clear to auscultation bilaterally; No rales, rhonchi, wheezing  CARDIOVASCULAR: Si S2, No murmurs;  GI: Soft, non distended, normal bowel sounds  SKIN: Dry, intact, No rashes or lesions  MUSCULOSKELETAL: Has BL lower extremity edema.  NEURO:  no tremor, sensation decreased in feet BL,    POCT Blood Glucose.: 270 mg/dL (12-03-19 @ 12:01)  POCT Blood Glucose.: 345 mg/dL (12-03-19 @ 08:08)  POCT Blood Glucose.: 367 mg/dL (12-02-19 @ 20:45)                            15.1   10.34 )-----------( 317      ( 03 Dec 2019 09:36 )             45.7       12-03    133<L>  |  97  |  18  ----------------------------<  231<H>  4.1   |  24  |  0.65    EGFR if : 121  EGFR if non : 104    Ca    8.8      12-03    TPro  6.6  /  Alb  3.9  /  TBili  0.3  /  DBili  x   /  AST  27  /  ALT  28  /  AlkPhos  60  12-03      Thyroid Function Tests:  12-03 @ 09:10 TSH 2.32 FreeT4 -- T3 -- Anti TPO -- Anti Thyroglobulin Ab -- TSI --      Hemoglobin A1C, Whole Blood: 11.2 % <H> [4.0 - 5.6] (12-03-19 @ 09:36)      12-03 Chol 189 LDL Unable to calculate Test Repeated  LDL Cholesterol (mg/dL) --- Interpretive Comment (for adults 18 and over)  Optimal LDL Level may vary based on clinical situation  Below 70                   Ideal for people at very high risk of heart  disease  Below 100                  Ideal for people at risk of heart disease  100 - 129                    [place]  130 - 159                    Borderline high  160 - 189                    High  190 and Above           Very high HDL 27<L> Trig 694<H>    Radiology:

## 2019-12-03 NOTE — CONSULT NOTE ADULT - ASSESSMENT
Patient speaks Yoruba;  service used #[id number]  Assessment  DMT2: 64y Male with DM T2 with hyperglycemia, A1C 11.2%, was on oral meds at home (Glimepiride 2mg BID, Janumet 50/1000 BID), now on insulin coverage, blood sugars running high and not at target (, 270), no hypoglycemic episode, eating meals, appears comfortable, wife by the bedside.  A.fib: on medications, stable, monitored.  HTN: Controlled,  on antihypertensive medications.  HLD: Controlled, on statin.          Jack Britton MD  Cell: [phone]  Office: [phone] Patient speaks Romansh;  service used #[id number]  Assessment  DMT2: 64y Male with DM T2 with hyperglycemia, A1C 11.2%, was on oral meds at home (Glimepiride 2mg BID, Janumet 50/1000 BID), now on insulin coverage, blood sugars running high and not at target (, 270), no hypoglycemic episode, eating meals, appears comfortable, wife by the bedside.  A.fib: on medications,  stable, monitored.  HTN: Controlled,  on antihypertensive medications.  HLD: Controlled, on statin.          Jack Britton MD  Cell: [phone]  Office: [phone]

## 2019-12-03 NOTE — PROGRESS NOTE ADULT - PROBLEM SELECTOR PLAN 1
tele monitoring   S/P Cardizem with no improvement  DIg load given overnight, now in SR  S/P IV hydration   cont Xorelto for AC  Echo  close F/U with outpatient cardiologist

## 2019-12-03 NOTE — PROGRESS NOTE ADULT - SUBJECTIVE AND OBJECTIVE BOX
Patient is a 64y old  Male who presents with a chief complaint of Afib with RVR (03 Dec 2019 14:38)      INTERVAL HISTORY: feels ok    TELEMETRY: converted to SR  	  MEDICATIONS:  ATENolol  Tablet 50 milliGRAM(s) Oral daily        PHYSICAL EXAM:  T(C): 37 (12-03-19 @ 19:58), Max: 37 (12-03-19 @ 19:58)  HR: 70 (12-03-19 @ 19:58) (64 - 70)  BP: 134/77 (12-03-19 @ 19:58) (126/73 - 134/77)  RR: 18 (12-03-19 @ 19:58) (18 - 18)  SpO2: 95% (12-03-19 @ 19:58) (95% - 97%)  Wt(kg): --  I&O's Summary    03 Dec 2019 07:01  -  04 Dec 2019 00:22  --------------------------------------------------------  IN: 840 mL / OUT: 0 mL / NET: 840 mL          Appearance: In no distress	  HEENT:    PERRL, EOMI	  Cardiovascular:  S1 S2, No JVD  Respiratory: Lungs clear to auscultation	  Gastrointestinal:  Soft, Non-tender, + BS	  Extremities:  No edema of LE                                15.1   10.34 )-----------( 317      ( 03 Dec 2019 09:36 )             45.7     12-03    133<L>  |  97  |  18  ----------------------------<  231<H>  4.1   |  24  |  0.65    Ca    8.8      03 Dec 2019 05:43    TPro  6.6  /  Alb  3.9  /  TBili  0.3  /  DBili  x   /  AST  27  /  ALT  28  /  AlkPhos  60  12-03        Labs personally reviewed      Assessment and Plan:   Assessment:  · Assessment		  Pt is a 63 y/o male with PMHx of hypertrophic cardiomyopathy S/P defibrillator, DM, HTN, HLD, afib on xarelto presenting with sharp nonradiating chest pain. found to have a fib with RVR      Problem/Plan - 1:  ·  Problem: Atrial fibrillation with RVR.  Plan: tele monitoring   S/P Cardizem with no improvement  spontaneous conversion to SR  cont Xorelto for AC.     Problem/Plan - 2:  ·  Problem: Chest pain, atypical.  Plan:   mild elevation in tryptonemia due to A fib with RVR  TTE pending    Problem/Plan - 3:  ·  Problem: HTN (hypertension).  Plan: hold ARBs in view of low BP  BP ok off ARB    Problem/Plan - 4:  ·  Problem: HLD (hyperlipidemia).  Plan: lipid panel  statin.       Girma Mix DO [facility]  Cardiovascular Medicine  [phone]

## 2019-12-03 NOTE — PROGRESS NOTE ADULT - SUBJECTIVE AND OBJECTIVE BOX
[facility]    Subjective: Patient seen and examined. No new events except as noted.   feeling better  converted to sinus this AM     REVIEW OF SYSTEMS:    CONSTITUTIONAL: No weakness, fevers or chills  EYES/ENT: No visual changes;  No vertigo or throat pain   NECK: No pain or stiffness  RESPIRATORY: No cough, wheezing, hemoptysis; No shortness of breath  CARDIOVASCULAR: No chest pain or palpitations  GASTROINTESTINAL: No abdominal or epigastric pain. No nausea, vomiting, or hematemesis; No diarrhea or constipation. No melena or hematochezia.  GENITOURINARY: No dysuria, frequency or hematuria  NEUROLOGICAL: No numbness or weakness  SKIN: No itching, burning, rashes, or lesions   All other review of systems is negative unless indicated above.    MEDICATIONS:  MEDICATIONS  (STANDING):  ATENolol  Tablet 50 milliGRAM(s) Oral daily  atorvastatin 40 milliGRAM(s) Oral at bedtime  dextrose 5%. 1000 milliLiter(s) (50 mL/Hr) IV Continuous <Continuous>  dextrose 50% Injectable 12.5 Gram(s) IV Push once  dextrose 50% Injectable 25 Gram(s) IV Push once  dextrose 50% Injectable 25 Gram(s) IV Push once  influenza   Vaccine 0.5 milliLiter(s) IntraMuscular once  insulin lispro (HumaLOG) corrective regimen sliding scale   SubCutaneous three times a day before meals  insulin lispro (HumaLOG) corrective regimen sliding scale   SubCutaneous at bedtime  pantoprazole    Tablet 40 milliGRAM(s) Oral before breakfast  rivaroxaban 20 milliGRAM(s) Oral with dinner      PHYSICAL EXAM:  T(C): 36.3 (12-03-19 @ 11:59), Max: 36.7 (12-02-19 @ 19:28)  HR: 64 (12-03-19 @ 11:59) (64 - 136)  BP: 133/84 (12-03-19 @ 11:59) (103/71 - 133/84)  RR: 18 (12-03-19 @ 11:59) (17 - 18)  SpO2: 96% (12-03-19 @ 11:59) (95% - 98%)  Wt(kg): --  I&O's Summary    Height (cm): 169 (12-02 @ 15:19)  Weight (kg): 70 (12-02 @ 15:19)  BMI (kg/m2): 24.5 (12-02 @ 15:19)  BSA (m2): 1.8 (12-02 @ 15:19)    Appearance: Normal	  HEENT:   Normal oral mucosa, PERRL, EOMI	  Lymphatic: No lymphadenopathy , no edema  Cardiovascular: Normal S1 S2, No JVD, No murmurs , Peripheral pulses palpable 2+ bilaterally  Respiratory: Lungs clear to auscultation, normal effort 	  Gastrointestinal:  Soft, Non-tender, + BS	  Skin: No rashes, No ecchymoses, No cyanosis, warm to touch  Musculoskeletal: Normal range of motion, normal strength  Psychiatry:  Mood & affect appropriate  Ext: No edema      All labs, Imaging and EKGs personally reviewed                             15.1   10.34 )-----------( 317      ( 03 Dec 2019 09:36 )             45.7               12-03    133<L>  |  97  |  18  ----------------------------<  231<H>  4.1   |  24  |  0.65    Ca    8.8      03 Dec 2019 05:43    TPro  6.6  /  Alb  3.9  /  TBili  0.3  /  DBili  x   /  AST  27  /  ALT  28  /  AlkPhos  60  12-03    PT/INR - ( 02 Dec 2019 17:13 )   PT: 10.9 sec;   INR: 0.95 ratio         PTT - ( 02 Dec 2019 17:13 )  PTT:33.7 sec

## 2019-12-03 NOTE — CONSULT NOTE ADULT - ATTENDING COMMENTS
Will increase Humalog correction scale coverage to be moderate-scale.  Will continue monitoring FS, log, and FU.

## 2019-12-04 ENCOUNTER — TRANSCRIPTION ENCOUNTER (OUTPATIENT)
Age: 64
End: 2019-12-04

## 2019-12-04 VITALS
HEART RATE: 64 BPM | DIASTOLIC BLOOD PRESSURE: 83 MMHG | RESPIRATION RATE: 18 BRPM | TEMPERATURE: 98 F | SYSTOLIC BLOOD PRESSURE: 144 MMHG | OXYGEN SATURATION: 94 %

## 2019-12-04 PROCEDURE — 71046 X-RAY EXAM CHEST 2 VIEWS: CPT

## 2019-12-04 PROCEDURE — 70450 CT HEAD/BRAIN W/O DYE: CPT

## 2019-12-04 PROCEDURE — 84132 ASSAY OF SERUM POTASSIUM: CPT

## 2019-12-04 PROCEDURE — 83880 ASSAY OF NATRIURETIC PEPTIDE: CPT

## 2019-12-04 PROCEDURE — 85610 PROTHROMBIN TIME: CPT

## 2019-12-04 PROCEDURE — 83036 HEMOGLOBIN GLYCOSYLATED A1C: CPT

## 2019-12-04 PROCEDURE — 85027 COMPLETE CBC AUTOMATED: CPT

## 2019-12-04 PROCEDURE — 82330 ASSAY OF CALCIUM: CPT

## 2019-12-04 PROCEDURE — 85730 THROMBOPLASTIN TIME PARTIAL: CPT

## 2019-12-04 PROCEDURE — 82435 ASSAY OF BLOOD CHLORIDE: CPT

## 2019-12-04 PROCEDURE — 84443 ASSAY THYROID STIM HORMONE: CPT

## 2019-12-04 PROCEDURE — 82803 BLOOD GASES ANY COMBINATION: CPT

## 2019-12-04 PROCEDURE — C8929: CPT

## 2019-12-04 PROCEDURE — 80053 COMPREHEN METABOLIC PANEL: CPT

## 2019-12-04 PROCEDURE — 93356 MYOCRD STRAIN IMG SPCKL TRCK: CPT

## 2019-12-04 PROCEDURE — 84484 ASSAY OF TROPONIN QUANT: CPT

## 2019-12-04 PROCEDURE — 82962 GLUCOSE BLOOD TEST: CPT

## 2019-12-04 PROCEDURE — 82947 ASSAY GLUCOSE BLOOD QUANT: CPT

## 2019-12-04 PROCEDURE — 0399T: CPT

## 2019-12-04 PROCEDURE — 85014 HEMATOCRIT: CPT

## 2019-12-04 PROCEDURE — 80061 LIPID PANEL: CPT

## 2019-12-04 PROCEDURE — 84295 ASSAY OF SERUM SODIUM: CPT

## 2019-12-04 PROCEDURE — 99291 CRITICAL CARE FIRST HOUR: CPT | Mod: 25

## 2019-12-04 PROCEDURE — 93306 TTE W/DOPPLER COMPLETE: CPT | Mod: 26

## 2019-12-04 PROCEDURE — 86803 HEPATITIS C AB TEST: CPT

## 2019-12-04 PROCEDURE — 83605 ASSAY OF LACTIC ACID: CPT

## 2019-12-04 PROCEDURE — 93005 ELECTROCARDIOGRAM TRACING: CPT

## 2019-12-04 PROCEDURE — 96374 THER/PROPH/DIAG INJ IV PUSH: CPT

## 2019-12-04 RX ORDER — GLIMEPIRIDE 1 MG
1 TABLET ORAL
Qty: 0 | Refills: 0 | DISCHARGE

## 2019-12-04 RX ORDER — OMEPRAZOLE 10 MG/1
1 CAPSULE, DELAYED RELEASE ORAL
Qty: 0 | Refills: 0 | DISCHARGE

## 2019-12-04 RX ORDER — PANTOPRAZOLE SODIUM 20 MG/1
1 TABLET, DELAYED RELEASE ORAL
Qty: 0 | Refills: 0 | DISCHARGE
Start: 2019-12-04

## 2019-12-04 RX ORDER — GLIMEPIRIDE 1 MG
2 TABLET ORAL
Qty: 120 | Refills: 0
Start: 2019-12-04 | End: 2020-01-02

## 2019-12-04 RX ORDER — INSULIN GLARGINE 100 [IU]/ML
12 INJECTION, SOLUTION SUBCUTANEOUS AT BEDTIME
Refills: 0 | Status: DISCONTINUED | OUTPATIENT
Start: 2019-12-04 | End: 2019-12-04

## 2019-12-04 RX ORDER — ATENOLOL 25 MG/1
1 TABLET ORAL
Qty: 0 | Refills: 0 | DISCHARGE

## 2019-12-04 RX ORDER — GLIMEPIRIDE 1 MG
2 TABLET ORAL
Qty: 0 | Refills: 0 | DISCHARGE

## 2019-12-04 RX ORDER — INSULIN LISPRO 100/ML
6 VIAL (ML) SUBCUTANEOUS
Refills: 0 | Status: DISCONTINUED | OUTPATIENT
Start: 2019-12-04 | End: 2019-12-04

## 2019-12-04 RX ADMIN — PANTOPRAZOLE SODIUM 40 MILLIGRAM(S): 20 TABLET, DELAYED RELEASE ORAL at 05:09

## 2019-12-04 RX ADMIN — ATENOLOL 50 MILLIGRAM(S): 25 TABLET ORAL at 05:09

## 2019-12-04 RX ADMIN — Medication 8: at 12:32

## 2019-12-04 RX ADMIN — Medication 4: at 09:05

## 2019-12-04 NOTE — DISCHARGE NOTE NURSING/CASE MANAGEMENT/SOCIAL WORK - PATIENT PORTAL LINK FT
You can access the FollowMyHealth Patient Portal offered by [facility] by registering at the following website: [email or web address]. By joining [employer]’s FollowMyHealth portal, you will also be able to view your health information using other applications (apps) compatible with our system.

## 2019-12-04 NOTE — PROGRESS NOTE ADULT - ATTENDING COMMENTS
DC plan:  -Continue home dose Janumet 50/1000 BID  -Increase Glimepiride to 4mg BID  -Close FU outpatient endo in 1-2 weeks; Patient is a candidate for insulin tx.  -Patient counseled for compliance with consistent low carb diet and exercise as tolerated outpatient.
D/C home after echo with close PMD and cardiology F/U

## 2019-12-04 NOTE — CHART NOTE - NSCHARTNOTEFT_GEN_A_CORE
Medically cleared for discharge by Dr. Taylor and Dr. Mix. Continue current medications, increase glimeperide as per Endo. Restart irbesartan and verapamil as per Dr. Taylor. Follow up with Dr. Shearer in office in 2 weeks. Instructions reviewed with patient with  phone

## 2019-12-04 NOTE — DISCHARGE NOTE PROVIDER - NSDCMRMEDTOKEN_GEN_ALL_CORE_FT
atenolol 50 mg oral tablet: 1 tab(s) orally 2 times a day  glimepiride 2 mg oral tablet: 1 tab(s) orally 2 times a day  irbesartan 300 mg oral tablet: 1 tab(s) orally once a day  Janumet 50 mg-1000 mg oral tablet: 1 tab(s) orally 2 times a day  loratadine 10 mg oral tablet: 1 tab(s) orally once a day, As Needed  omeprazole 40 mg oral delayed release capsule: 1 cap(s) orally once a day  pantoprazole 40 mg oral delayed release tablet: 1 tab(s) orally once a day (before a meal)  pravastatin 40 mg oral tablet: 1 tab(s) orally once a day  verapamil 120 mg/24 hours oral capsule, extended release: 1 cap(s) orally once a day  Xarelto 20 mg oral tablet: 1 tab(s) orally once a day (in the evening) atenolol 50 mg oral tablet: 1 tab(s) orally once a day  glimepiride 2 mg oral tablet: 2 tab(s) orally 2 times a day  irbesartan 300 mg oral tablet: 1 tab(s) orally once a day  Janumet 50 mg-1000 mg oral tablet: 1 tab(s) orally 2 times a day  loratadine 10 mg oral tablet: 1 tab(s) orally once a day, As Needed  omeprazole 40 mg oral delayed release capsule: 1 cap(s) orally once a day  pantoprazole 40 mg oral delayed release tablet: 1 tab(s) orally once a day (before a meal)  pravastatin 40 mg oral tablet: 1 tab(s) orally once a day  verapamil 120 mg/24 hours oral capsule, extended release: 1 cap(s) orally once a day  Xarelto 20 mg oral tablet: 1 tab(s) orally once a day (in the evening) atenolol 50 mg oral tablet: 1 tab(s) orally once a day  glimepiride 2 mg oral tablet: 2 tab(s) orally 2 times a day  irbesartan 300 mg oral tablet: 1 tab(s) orally once a day  Janumet 50 mg-1000 mg oral tablet: 1 tab(s) orally 2 times a day  loratadine 10 mg oral tablet: 1 tab(s) orally once a day, As Needed  pantoprazole 40 mg oral delayed release tablet: 1 tab(s) orally once a day (before a meal)  pravastatin 40 mg oral tablet: 1 tab(s) orally once a day  verapamil 120 mg/24 hours oral capsule, extended release: 1 cap(s) orally once a day  Xarelto 20 mg oral tablet: 1 tab(s) orally once a day (in the evening)

## 2019-12-04 NOTE — PROGRESS NOTE ADULT - PROBLEM SELECTOR PLAN 1
Will start patient on basal bolus insulin: Lantus 12u at bedtime, Humalog 6u before each meal, and continue sliding scale. Will continue monitoring FS, log, and FU.  DC plan:  -Continue home dose Janumet 50/1000 BID  -Increase Glimepiride to 4mg BID  -Close FU outpatient endo in 1-2 weeks; Patient is a candidate for insulin tx.  -Patient counseled for compliance with consistent low carb diet and exercise as tolerated outpatient. Will start patient on basal bolus insulin: Lantus 12u at bedtime, Humalog 6u before each meal, and continue sliding scale. Will continue monitoring FS, log, and FU.

## 2019-12-04 NOTE — DISCHARGE NOTE PROVIDER - NSDCCPCAREPLAN_GEN_ALL_CORE_FT
PRINCIPAL DISCHARGE DIAGNOSIS  Diagnosis: Atypical chest pain  Assessment and Plan of Treatment:   HOME CARE INSTRUCTIONS  For the next few days, avoid physical activities that bring on chest pain. Continue physical activities as directed.  Do not smoke.  Avoid drinking alcohol.   Only take over-the-counter or prescription medicine for pain, discomfort, or fever as directed by your caregiver.  Follow your caregiver's suggestions for further testing if your chest pain does not go away.  Keep any follow-up appointments you made. If you do not go to an appointment, you could develop lasting (chronic) problems with pain. If there is any problem keeping an appointment, you must call to reschedule.   SEEK MEDICAL CARE IF:  You think you are having problems from the medicine you are taking. Read your medicine instructions carefully.  Your chest pain does not go away, even after treatment.  You develop a rash with blisters on your chest.  SEEK IMMEDIATE MEDICAL CARE IF:  You have increased chest pain or pain that spreads to your arm, neck, jaw, back, or abdomen.   You develop shortness of breath, an increasing cough, or you are coughing up blood.  You have severe back or abdominal pain, feel nauseous, or vomit.  You develop severe weakness, fainting, or chills.  You have a fever.        SECONDARY DISCHARGE DIAGNOSES  Diagnosis: Abnormal head CT  Assessment and Plan of Treatment: A few nonspecific punctate of calcific foci in the left parietal lobe. Follow up with Dr. Tolliver for monitoring    Diagnosis: Atrial fibrillation  Assessment and Plan of Treatment: Atrial fibrillation is the most common heart rhythm problem.  The condition puts you at risk for has stroke and heart attack  It helps if you control your blood pressure, not drink more than 1-2 alcohol drinks per day, cut down on caffeine, getting treatment for over active thyroid gland, and get regular exercise  Call your doctor if you feel your heart racing or beating unusually, chest tightness or pain, lightheaded, faint, shortness of breath especially with exercise  It is important to take your heart medication as prescribed  You may be on anticoagulation which is very important to take as directed - you may need blood work to monitor drug levels      Diagnosis: HTN (hypertension)  Assessment and Plan of Treatment: Follow up with your medical doctor to establish long term blood pressure treatment goals.      Diagnosis: Diabetes  Assessment and Plan of Treatment: HgA1C this admission.  Make sure you get your HgA1c checked every three months.  If you take oral diabetes medications, check your blood glucose two times a day.  If you take insulin, check your blood glucose before meals and at bedtime.  It's important not to skip any meals.  Keep a log of your blood glucose results and always take it with you to your doctor appointments.  Keep a list of your current medications including injectables and over the counter medications and bring this medication list with you to all your doctor appointments.  If you have not seen your ophthalmologist this year call for appointment.  Check your feet daily for redness, sores, or openings. Do not self treat. If no improvement in two days call your primary care physician for an appointment.  Low blood sugar (hypoglycemia) is a blood sugar below 70mg/dl. Check your blood sugar if you feel signs/symptoms of hypoglycemia. If your blood sugar is below 70 take 15 grams of carbohydrates (ex 4 oz of apple juice, 3-4 glucose tablets, or 4-6 oz of regular soda) wait 15 minutes and repeat blood sugar to make sure it comes up above 70.  If your blood sugar is above 70 and you are due for a meal, have a meal.  If you are not due for a meal have a snack.  This snack helps keeps your blood sugar at a safe range. PRINCIPAL DISCHARGE DIAGNOSIS  Diagnosis: Atypical chest pain  Assessment and Plan of Treatment:   HOME CARE INSTRUCTIONS  For the next few days, avoid physical activities that bring on chest pain. Continue physical activities as directed.  Do not smoke.  Avoid drinking alcohol.   Only take over-the-counter or prescription medicine for pain, discomfort, or fever as directed by your caregiver.  Follow your caregiver's suggestions for further testing if your chest pain does not go away.  Keep any follow-up appointments you made. If you do not go to an appointment, you could develop lasting (chronic) problems with pain. If there is any problem keeping an appointment, you must call to reschedule.   SEEK MEDICAL CARE IF:  You think you are having problems from the medicine you are taking. Read your medicine instructions carefully.  Your chest pain does not go away, even after treatment.  You develop a rash with blisters on your chest.  SEEK IMMEDIATE MEDICAL CARE IF:  You have increased chest pain or pain that spreads to your arm, neck, jaw, back, or abdomen.   You develop shortness of breath, an increasing cough, or you are coughing up blood.  You have severe back or abdominal pain, feel nauseous, or vomit.  You develop severe weakness, fainting, or chills.  You have a fever.        SECONDARY DISCHARGE DIAGNOSES  Diagnosis: Abnormal head CT  Assessment and Plan of Treatment: A few nonspecific punctate of calcific foci in the left parietal lobe. Follow up with Dr. Tolliver for monitoring    Diagnosis: Atrial fibrillation  Assessment and Plan of Treatment: Atrial fibrillation is the most common heart rhythm problem.  The condition puts you at risk for has stroke and heart attack  It helps if you control your blood pressure, not drink more than 1-2 alcohol drinks per day, cut down on caffeine, getting treatment for over active thyroid gland, and get regular exercise  Call your doctor if you feel your heart racing or beating unusually, chest tightness or pain, lightheaded, faint, shortness of breath especially with exercise  It is important to take your heart medication as prescribed  You may be on anticoagulation which is very important to take as directed - you may need blood work to monitor drug levels      Diagnosis: HTN (hypertension)  Assessment and Plan of Treatment: Follow up with your medical doctor to establish long term blood pressure treatment goals.      Diagnosis: Diabetes  Assessment and Plan of Treatment: HgA1C this admission 11.2  Make sure you get your HgA1c checked every three months.  If you take oral diabetes medications, check your blood glucose two times a day.  If you take insulin, check your blood glucose before meals and at bedtime.  It's important not to skip any meals.  Keep a log of your blood glucose results and always take it with you to your doctor appointments.  Keep a list of your current medications including injectables and over the counter medications and bring this medication list with you to all your doctor appointments.  If you have not seen your ophthalmologist this year call for appointment.  Check your feet daily for redness, sores, or openings. Do not self treat. If no improvement in two days call your primary care physician for an appointment.  Low blood sugar (hypoglycemia) is a blood sugar below 70mg/dl. Check your blood sugar if you feel signs/symptoms of hypoglycemia. If your blood sugar is below 70 take 15 grams of carbohydrates (ex 4 oz of apple juice, 3-4 glucose tablets, or 4-6 oz of regular soda) wait 15 minutes and repeat blood sugar to make sure it comes up above 70.  If your blood sugar is above 70 and you are due for a meal, have a meal.  If you are not due for a meal have a snack.  This snack helps keeps your blood sugar at a safe range.  -Continue home dose Janumet 50/1000 BID  -Increase Glimepiride to 4mg BID  -Close FU outpatient endo in 1-2 weeks; Patient is a candidate for insulin tx.  -Patient counseled for compliance with consistent low carb diet and exercise as tolerated outpatient. PRINCIPAL DISCHARGE DIAGNOSIS  Diagnosis: Atypical chest pain  Assessment and Plan of Treatment:   HOME CARE INSTRUCTIONS  For the next few days, avoid physical activities that bring on chest pain. Continue physical activities as directed.  Do not smoke.  Avoid drinking alcohol.   Only take over-the-counter or prescription medicine for pain, discomfort, or fever as directed by your caregiver.  Follow your caregiver's suggestions for further testing if your chest pain does not go away.  Keep any follow-up appointments you made. If you do not go to an appointment, you could develop lasting (chronic) problems with pain. If there is any problem keeping an appointment, you must call to reschedule.   SEEK MEDICAL CARE IF:  You think you are having problems from the medicine you are taking. Read your medicine instructions carefully.  Your chest pain does not go away, even after treatment.  You develop a rash with blisters on your chest.  SEEK IMMEDIATE MEDICAL CARE IF:  You have increased chest pain or pain that spreads to your arm, neck, jaw, back, or abdomen.   You develop shortness of breath, an increasing cough, or you are coughing up blood.  You have severe back or abdominal pain, feel nauseous, or vomit.  You develop severe weakness, fainting, or chills.  You have a fever.        SECONDARY DISCHARGE DIAGNOSES  Diagnosis: Abnormal head CT  Assessment and Plan of Treatment: A few nonspecific punctate of calcific foci in the left parietal lobe. Follow up with Dr. Tolliver for monitoring    Diagnosis: Atrial fibrillation  Assessment and Plan of Treatment: Atrial fibrillation is the most common heart rhythm problem.  The condition puts you at risk for has stroke and heart attack  It helps if you control your blood pressure, not drink more than 1-2 alcohol drinks per day, cut down on caffeine, getting treatment for over active thyroid gland, and get regular exercise  Call your doctor if you feel your heart racing or beating unusually, chest tightness or pain, lightheaded, faint, shortness of breath especially with exercise  It is important to take your heart medication as prescribed  You may be on anticoagulation which is very important to take as directed - you may need blood work to monitor drug levels  Follow up with Dr. Shearer in 2 weeks       Diagnosis: HTN (hypertension)  Assessment and Plan of Treatment: Follow up with your medical doctor to establish long term blood pressure treatment goals.      Diagnosis: Diabetes  Assessment and Plan of Treatment: HgA1C this admission 11.2  Make sure you get your HgA1c checked every three months.  If you take oral diabetes medications, check your blood glucose two times a day.  If you take insulin, check your blood glucose before meals and at bedtime.  It's important not to skip any meals.  Keep a log of your blood glucose results and always take it with you to your doctor appointments.  Keep a list of your current medications including injectables and over the counter medications and bring this medication list with you to all your doctor appointments.  If you have not seen your ophthalmologist this year call for appointment.  Check your feet daily for redness, sores, or openings. Do not self treat. If no improvement in two days call your primary care physician for an appointment.  Low blood sugar (hypoglycemia) is a blood sugar below 70mg/dl. Check your blood sugar if you feel signs/symptoms of hypoglycemia. If your blood sugar is below 70 take 15 grams of carbohydrates (ex 4 oz of apple juice, 3-4 glucose tablets, or 4-6 oz of regular soda) wait 15 minutes and repeat blood sugar to make sure it comes up above 70.  If your blood sugar is above 70 and you are due for a meal, have a meal.  If you are not due for a meal have a snack.  This snack helps keeps your blood sugar at a safe range.  -Continue home dose Janumet 50/1000 BID  -Increase Glimepiride to 4mg BID  -Close FU outpatient endo in 1-2 weeks; Patient is a candidate for insulin tx.  -Patient counseled for compliance with consistent low carb diet and exercise as tolerated outpatient.

## 2019-12-04 NOTE — DISCHARGE NOTE PROVIDER - CARE PROVIDER_API CALL
Catrachita Tolliver)  Neurology; Vascular Neurology  [address] [place] [address]  Phone: [phone]  Fax: [phone]  Follow Up Time:     Girma Mix (DO)  Cardiovascular Disease; Internal Medicine; Nuclear Cardiology  [address] [facility], [address]  [place] [address]  Phone: [phone]  Fax: [phone]  Follow Up Time:     Jack Britton)  EndocrinologyMetabDiabetes; Internal Medicine  [address] [place] [address]  Phone: [phone]  Fax: [phone]  Follow Up Time:     Corby Brady (DO)  Internal Medicine  [address] [place] [address]  Phone: [phone]  Fax: [phone]  Follow Up Time: Catrachita Tolliver)  Neurology; Vascular Neurology  [address] [place] [address]  Phone: [phone]  Fax: [phone]  Follow Up Time:     Jack Britton)  EndocrinologyMetabDiabetes; Internal Medicine  [address] [place] [address]  Phone: [phone]  Fax: [phone]  Follow Up Time:     Corby Brayd (DO)  Internal Medicine  [address] [place] [address]  Phone: [phone]  Fax: [phone]  Follow Up Time:     Va Shearer)  Cardiovascular Disease; Internal Medicine; Nuclear Cardiology  [address] [facility] [address]  [place] [address]  Phone: [phone]  Fax: [phone]  Follow Up Time:

## 2019-12-04 NOTE — PROGRESS NOTE ADULT - SUBJECTIVE AND OBJECTIVE BOX
Chief complaint  Patient is a 64y old  Male who presents with a chief complaint of Afib with RVR (04 Dec 2019 08:24)   Review of systems  Patient in bed, looks comfortable, no fever, no hypoglycemia.    Labs and Fingersticks  CAPILLARY BLOOD GLUCOSE      POCT Blood Glucose.: 307 mg/dL (04 Dec 2019 12:20)  POCT Blood Glucose.: 235 mg/dL (04 Dec 2019 08:45)  POCT Blood Glucose.: 241 mg/dL (03 Dec 2019 21:21)  POCT Blood Glucose.: 249 mg/dL (03 Dec 2019 17:59)  POCT Blood Glucose.: 264 mg/dL (03 Dec 2019 16:29)      Anion Gap, Serum: 12 (12-03 @ 05:43)  Anion Gap, Serum: 17 (12-02 @ 17:13)    Hemoglobin A1C, Whole Blood: 11.2 <H> (12-03 @ 09:36)    Calcium, Total Serum: 8.8 (12-03 @ 05:43)  Calcium, Total Serum: 9.6 (12-02 @ 17:13)  Albumin, Serum: 3.9 (12-03 @ 05:43)  Albumin, Serum: 4.6 (12-02 @ 17:13)    Alanine Aminotransferase (ALT/SGPT): 28 (12-03 @ 05:43)  Alanine Aminotransferase (ALT/SGPT): 32 (12-02 @ 17:13)  Alkaline Phosphatase, Serum: 60 (12-03 @ 05:43)  Alkaline Phosphatase, Serum: 74 (12-02 @ 17:13)  Aspartate Aminotransferase (AST/SGOT): 27 (12-03 @ 05:43)  Aspartate Aminotransferase (AST/SGOT): 17 (12-02 @ 17:13)        12-03    133<L>  |  97  |  18  ----------------------------<  231<H>  4.1   |  24  |  0.65    Ca    8.8      03 Dec 2019 05:43    TPro  6.6  /  Alb  3.9  /  TBili  0.3  /  DBili  x   /  AST  27  /  ALT  28  /  AlkPhos  60  12-03                        15.1   10.34 )-----------( 317      ( 03 Dec 2019 09:36 )             45.7     Medications  MEDICATIONS  (STANDING):  ATENolol  Tablet 50 milliGRAM(s) Oral daily  atorvastatin 40 milliGRAM(s) Oral at bedtime  dextrose 5%. 1000 milliLiter(s) (50 mL/Hr) IV Continuous <Continuous>  dextrose 50% Injectable 12.5 Gram(s) IV Push once  dextrose 50% Injectable 25 Gram(s) IV Push once  dextrose 50% Injectable 25 Gram(s) IV Push once  influenza   Vaccine 0.5 milliLiter(s) IntraMuscular once  insulin glargine Injectable (LANTUS) 12 Unit(s) SubCutaneous at bedtime  insulin lispro (HumaLOG) corrective regimen sliding scale   SubCutaneous three times a day before meals  insulin lispro (HumaLOG) corrective regimen sliding scale   SubCutaneous at bedtime  insulin lispro Injectable (HumaLOG) 6 Unit(s) SubCutaneous three times a day before meals  pantoprazole    Tablet 40 milliGRAM(s) Oral before breakfast  rivaroxaban 20 milliGRAM(s) Oral with dinner      Physical Exam  General: Patient comfortable in bed  Vital Signs Last 12 Hrs  T(F): 97.9 (12-04-19 @ 11:44), Max: 97.9 (12-04-19 @ 11:44)  HR: 64 (12-04-19 @ 11:44) (64 - 65)  BP: 144/83 (12-04-19 @ 11:44) (144/83 - 144/89)  BP(mean): --  RR: 18 (12-04-19 @ 11:44) (18 - 18)  SpO2: 94% (12-04-19 @ 11:44) (94% - 97%)  Neck: No palpable thyroid nodules.  CVS: S1S2, No murmurs  Respiratory: No wheezing, no crepitations  GI: Abdomen soft, bowel sounds positive  Musculoskeletal:  edema lower extremities.   Skin: No skin rashes, no ecchymosis    Diagnostics Chief complaint  Patient is a 64y old  Male who presents with a chief complaint of Afib with RVR (04 Dec 2019 08:24)   Review of systems  Patient in bed, looks comfortable, no fever,  no hypoglycemia.    Labs and Fingersticks  CAPILLARY BLOOD GLUCOSE      POCT Blood Glucose.: 307 mg/dL (04 Dec 2019 12:20)  POCT Blood Glucose.: 235 mg/dL (04 Dec 2019 08:45)  POCT Blood Glucose.: 241 mg/dL (03 Dec 2019 21:21)  POCT Blood Glucose.: 249 mg/dL (03 Dec 2019 17:59)  POCT Blood Glucose.: 264 mg/dL (03 Dec 2019 16:29)      Anion Gap, Serum: 12 (12-03 @ 05:43)  Anion Gap, Serum: 17 (12-02 @ 17:13)    Hemoglobin A1C, Whole Blood: 11.2 <H> (12-03 @ 09:36)    Calcium, Total Serum: 8.8 (12-03 @ 05:43)  Calcium, Total Serum: 9.6 (12-02 @ 17:13)  Albumin, Serum: 3.9 (12-03 @ 05:43)  Albumin, Serum: 4.6 (12-02 @ 17:13)    Alanine Aminotransferase (ALT/SGPT): 28 (12-03 @ 05:43)  Alanine Aminotransferase (ALT/SGPT): 32 (12-02 @ 17:13)  Alkaline Phosphatase, Serum: 60 (12-03 @ 05:43)  Alkaline Phosphatase, Serum: 74 (12-02 @ 17:13)  Aspartate Aminotransferase (AST/SGOT): 27 (12-03 @ 05:43)  Aspartate Aminotransferase (AST/SGOT): 17 (12-02 @ 17:13)        12-03    133<L>  |  97  |  18  ----------------------------<  231<H>  4.1   |  24  |  0.65    Ca    8.8      03 Dec 2019 05:43    TPro  6.6  /  Alb  3.9  /  TBili  0.3  /  DBili  x   /  AST  27  /  ALT  28  /  AlkPhos  60  12-03                        15.1   10.34 )-----------( 317      ( 03 Dec 2019 09:36 )             45.7     Medications  MEDICATIONS  (STANDING):  ATENolol  Tablet 50 milliGRAM(s) Oral daily  atorvastatin 40 milliGRAM(s) Oral at bedtime  dextrose 5%. 1000 milliLiter(s) (50 mL/Hr) IV Continuous <Continuous>  dextrose 50% Injectable 12.5 Gram(s) IV Push once  dextrose 50% Injectable 25 Gram(s) IV Push once  dextrose 50% Injectable 25 Gram(s) IV Push once  influenza   Vaccine 0.5 milliLiter(s) IntraMuscular once  insulin glargine Injectable (LANTUS) 12 Unit(s) SubCutaneous at bedtime  insulin lispro (HumaLOG) corrective regimen sliding scale   SubCutaneous three times a day before meals  insulin lispro (HumaLOG) corrective regimen sliding scale   SubCutaneous at bedtime  insulin lispro Injectable (HumaLOG) 6 Unit(s) SubCutaneous three times a day before meals  pantoprazole    Tablet 40 milliGRAM(s) Oral before breakfast  rivaroxaban 20 milliGRAM(s) Oral with dinner      Physical Exam  General: Patient comfortable in bed  Vital Signs Last 12 Hrs  T(F): 97.9 (12-04-19 @ 11:44), Max: 97.9 (12-04-19 @ 11:44)  HR: 64 (12-04-19 @ 11:44) (64 - 65)  BP: 144/83 (12-04-19 @ 11:44) (144/83 - 144/89)  BP(mean): --  RR: 18 (12-04-19 @ 11:44) (18 - 18)  SpO2: 94% (12-04-19 @ 11:44) (94% - 97%)  Neck: No palpable thyroid nodules.  CVS: S1S2, No murmurs  Respiratory: No wheezing, no crepitations  GI: Abdomen soft, bowel sounds positive  Musculoskeletal:  edema lower extremities.   Skin: No skin rashes, no ecchymosis    Diagnostics

## 2019-12-04 NOTE — DISCHARGE NOTE PROVIDER - HOSPITAL COURSE
Pt is a 65 y/o male with PMHx of hypertrophic cardiomyopathy S/P defibrillator, DM, HTN, HLD, afib on xarelto presenting with sharp nonradiating chest pain. found to have a fib with RVR         Problem/Plan - 1:    ·  Problem: Atrial fibrillation with RVR.  Plan: tele monitoring     S/P Cardizem with no improvement    DIg load given overnight, now in SR    S/P IV hydration     cont Xorelto for AC    Echo    close F/U with outpatient cardiologist.          Problem/Plan - 2:    ·  Problem: Chest pain, atypical.  Plan: serial CE and EKG    mild elevation in tryptonemia due to A fib with RVR    Card eval appreciated.          Problem/Plan - 3:    ·  Problem: Headache.  Plan: CT head noted    improved headache    Neuro eval appreciated     Hx of CVA in the past    no known intracranial pathology as per pt.          Problem/Plan - 4:    ·  Problem: HTN (hypertension).  Plan: hold ARBs in view of low BP    may restart when HR stable     monitor vitals.          Problem/Plan - 5:    ·  Problem: HLD (hyperlipidemia).  Plan: lipid panel    statin.          Problem/Plan - 6:    Problem: Diabetes. Plan: sliding scale    hold oral mes    A1C 11    ENdo eval appreciated.         Problem/Plan - 7:    ·  Problem: Prophylactic measure.  Plan: DVT and GI PPX.

## 2019-12-04 NOTE — DISCHARGE NOTE PROVIDER - PROVIDER TOKENS
PROVIDER:[TOKEN:[[id number]]],PROVIDER:[TOKEN:[[id number]]],PROVIDER:[TOKEN:[[id number]]],PROVIDER:[TOKEN:[[id number]]] PROVIDER:[TOKEN:[[id number]]],PROVIDER:[TOKEN:[[id number]]],PROVIDER:[TOKEN:[[id number]]],PROVIDER:[TOKEN:[[id number]]]

## 2019-12-04 NOTE — PROGRESS NOTE ADULT - SUBJECTIVE AND OBJECTIVE BOX
Patient is a 64y old  Male who presents with a chief complaint of Afib with RVR (04 Dec 2019 14:13)      INTERVAL HISTORY: feels ok    	  MEDICATIONS:  ATENolol  Tablet 50 milliGRAM(s) Oral daily        PHYSICAL EXAM:  T(C): 36.6 (12-04-19 @ 11:44), Max: 36.6 (12-04-19 @ 04:05)  HR: 64 (12-04-19 @ 11:44) (64 - 65)  BP: 144/83 (12-04-19 @ 11:44) (144/83 - 144/89)  RR: 18 (12-04-19 @ 11:44) (18 - 18)  SpO2: 94% (12-04-19 @ 11:44) (94% - 97%)  Wt(kg): --  I&O's Summary    03 Dec 2019 07:01  -  04 Dec 2019 07:00  --------------------------------------------------------  IN: 960 mL / OUT: 0 mL / NET: 960 mL    04 Dec 2019 07:01  -  05 Dec 2019 01:16  --------------------------------------------------------  IN: 500 mL / OUT: 0 mL / NET: 500 mL          Appearance: In no distress	  HEENT:    PERRL, EOMI	  Cardiovascular:  S1 S2, No JVD  Respiratory: Lungs clear to auscultation	  Gastrointestinal:  Soft, Non-tender, + BS	  Extremities:  No edema of LE                                15.1   10.34 )-----------( 317      ( 03 Dec 2019 09:36 )             45.7     12-03    133<L>  |  97  |  18  ----------------------------<  231<H>  4.1   |  24  |  0.65    Ca    8.8      03 Dec 2019 05:43    TPro  6.6  /  Alb  3.9  /  TBili  0.3  /  DBili  x   /  AST  27  /  ALT  28  /  AlkPhos  60  12-03        Labs personally reviewed      Assessment and Plan:   Assessment:  · Assessment		  Pt is a 65 y/o male with PMHx of hypertrophic cardiomyopathy S/P defibrillator, DM, HTN, HLD, afib on xarelto presenting with sharp nonradiating chest pain. found to have a fib with RVR      Problem/Plan - 1:  ·  Problem: Atrial fibrillation with RVR.  Plan: tele monitoring   S/P Cardizem with no improvement  spontaneous conversion to SR  cont Xorelto for AC.     Problem/Plan - 2:  ·  Problem: Chest pain, atypical.  Plan:   mild elevation in tryptonemia due to A fib with RVR  TTE with known HCM  outpt genetic testing    Problem/Plan - 3:  ·  Problem: HTN (hypertension).  Plan: hold ARBs in view of low BP  BP ok off ARB    Problem/Plan - 4:  ·  Problem: HLD (hyperlipidemia).  Plan: lipid panel  statin.       Girma Mix DO [facility]  Cardiovascular Medicine  [phone]

## 2022-10-02 NOTE — ED PROVIDER NOTE - QRS
For information on Fall & Injury Prevention, visit: [email or web address] OR  [email or web address] OR  https://www.cdc.gov/steadi/patient.html 88

## 2023-01-18 PROBLEM — Z00.00 ENCOUNTER FOR PREVENTIVE HEALTH EXAMINATION: Status: ACTIVE | Noted: 2023-01-18

## 2023-01-25 ENCOUNTER — APPOINTMENT (OUTPATIENT)
Dept: VASCULAR SURGERY | Facility: CLINIC | Age: 68
End: 2023-01-25
Payer: MEDICARE

## 2023-01-25 VITALS
TEMPERATURE: 98 F | DIASTOLIC BLOOD PRESSURE: 94 MMHG | SYSTOLIC BLOOD PRESSURE: 147 MMHG | HEIGHT: 66.14 IN | BODY MASS INDEX: 25.86 KG/M2 | HEART RATE: 85 BPM | WEIGHT: 160.93 LBS

## 2023-01-25 DIAGNOSIS — F17.200 NICOTINE DEPENDENCE, UNSPECIFIED, UNCOMPLICATED: ICD-10-CM

## 2023-01-25 DIAGNOSIS — Z78.9 OTHER SPECIFIED HEALTH STATUS: ICD-10-CM

## 2023-01-25 PROCEDURE — 93931 UPPER EXTREMITY STUDY: CPT

## 2023-01-25 PROCEDURE — 99203 OFFICE O/P NEW LOW 30 MIN: CPT

## 2023-01-25 PROCEDURE — 93923 UPR/LXTR ART STDY 3+ LVLS: CPT

## 2023-01-26 PROBLEM — F17.200 CURRENT EVERY DAY SMOKER: Status: ACTIVE | Noted: 2023-01-25

## 2023-01-26 PROBLEM — Z78.9 SOCIAL ALCOHOL USE: Status: ACTIVE | Noted: 2023-01-25

## 2023-01-26 RX ORDER — LANSOPRAZOLE 30 MG/1
30 CAPSULE, DELAYED RELEASE ORAL
Refills: 0 | Status: ACTIVE | COMMUNITY

## 2023-01-26 RX ORDER — IRBESARTAN 300 MG/1
TABLET ORAL
Refills: 0 | Status: ACTIVE | COMMUNITY

## 2023-01-26 RX ORDER — VERAPAMIL HYDROCHLORIDE 80 MG/1
TABLET ORAL
Refills: 0 | Status: ACTIVE | COMMUNITY

## 2023-01-26 RX ORDER — PRAVASTATIN SODIUM 80 MG/1
TABLET ORAL
Refills: 0 | Status: ACTIVE | COMMUNITY

## 2023-01-26 RX ORDER — GLIMEPIRIDE 4 MG/1
TABLET ORAL
Refills: 0 | Status: ACTIVE | COMMUNITY

## 2023-01-26 RX ORDER — SITAGLIPTIN AND METFORMIN HYDROCHLORIDE 50; 1000 MG/1; MG/1
TABLET, FILM COATED ORAL
Refills: 0 | Status: ACTIVE | COMMUNITY

## 2023-01-26 RX ORDER — SEMAGLUTIDE 1.34 MG/ML
INJECTION, SOLUTION SUBCUTANEOUS
Refills: 0 | Status: ACTIVE | COMMUNITY

## 2023-01-26 RX ORDER — ATENOLOL 50 MG/1
TABLET ORAL
Refills: 0 | Status: ACTIVE | COMMUNITY

## 2023-01-26 RX ORDER — RIVAROXABAN 2.5 MG/1
TABLET, FILM COATED ORAL
Refills: 0 | Status: ACTIVE | COMMUNITY

## 2023-01-26 RX ORDER — THIAMINE HCL 50 MG
TABLET ORAL
Refills: 0 | Status: ACTIVE | COMMUNITY

## 2023-01-26 NOTE — DATA REVIEWED
[FreeTextEntry1] : RUE arterial duplex normal\par \par RUE PPGs (pressures)\par 1st digit 152\par 2nd digit 75\par 3rd digit 115\par 4th digit 162\par 5th digit 138

## 2023-01-26 NOTE — ASSESSMENT
[FreeTextEntry1] : Problem #1 right 2nd finger ischemia\par - likely cardioembolic secondary to being off of anticoagulation\par - slowly resolving\par - recommend warm dry compresses to help promote vasodilation\par - continue anticoagulation\par - no intervention indicated\par - NSAIDs for pain relief\par - follow up as needed

## 2023-01-26 NOTE — HISTORY OF PRESENT ILLNESS
[FreeTextEntry1] : 67 year old male who presents with right 2nd finger pain and discoloration since September 2022. At that time, he came off of his anticoagulation for a dental procedure. He developed discoloration and pain in his right 2nd and 3rd fingers. Since that time, he re-started anticoagulation and his 3rd finger discoloration resolved. However, his right 2nd finger has been persistently edematous, with purplish discoloration, and painful at rest and to touch. He denies any numbness in the finger or difficulty moving it. The discoloration is limited to the distal aspect of the finger.

## 2023-01-26 NOTE — PHYSICAL EXAM
[Respiratory Effort] : normal respiratory effort [Normal Rate and Rhythm] : normal rate and rhythm [2+] : right 2+ [Ankle Swelling (On Exam)] : not present [Varicose Veins Of Lower Extremities] : not present [] : not present [Abdomen Tenderness] : ~T ~M No abdominal tenderness [Skin Ulcer] : no ulcer [Alert] : not alert [Oriented to Person] : oriented to person [Oriented to Place] : oriented to place [Oriented to Time] : oriented to time [Calm] : calm [de-identified] : appears stated age [de-identified] : normocephalic, atraumatic [de-identified] : supple [FreeTextEntry1] : 2+ ulnar  [de-identified] : distal right 2nd finger with ischemic discoloration, tender to palpation, no sensorimotor deficit

## 2023-01-26 NOTE — REASON FOR VISIT
[Initial Evaluation] : an initial evaluation [Family Member] : family member [FreeTextEntry1] : right finger pain

## 2024-01-08 NOTE — ED ADULT NURSE NOTE - NS ED NURSE DISCH DISPOSITION
HISTORY OF PRESENT ILLNESS  Chief Complaint   Patient presents with    Weight Check     +15     Khanh Honeycutt is a 44 year old male here for follow up with medical weight loss program for the treatment of overweight, obesity, or morbid obesity.     Up #15 lbs  Compliant on vyvanse 30mg (was off of the medication for about 1-2 months due to pharmacy shortages)   Tolerating well, helping with decreasing appetite and no side effects     Success: Made smoothies for everyone instead of going out to breakfast during holidays   Challenging: House guests brought way too many snacks while staying during holidays, and I grabbed something pretty much every time I walked by for a few days   Is going to restart physical activity this week  Exercise/Activity: 2x/ week, via walking 10,000 steps  Nutrition: eating regular meals, +protein, minimal veggies. + tracking reports  Meals out per week on average: 3  Stress is manageable   Sleep: 6 hours/night, waking up feeling rested    Denies chest pain, shortness of breath, dizziness, blurred vision, headache, paresthesia, nausea/vomiting.     Breakfast Lunch Dinner Snacks Fluids   Reviewed              Wt Readings from Last 6 Encounters:   01/08/24 256 lb (116.1 kg)   11/02/23 243 lb 3.2 oz (110.3 kg)   10/11/23 241 lb 4 oz (109.4 kg)   08/30/23 233 lb (105.7 kg)   07/05/23 246 lb (111.6 kg)   03/30/23 255 lb 12.8 oz (116 kg)          REVIEW OF SYSTEMS  GENERAL: feels well otherwise, denied any fevers chills or night sweats   LUNGS: denies shortness of breath  CARDIOVASCULAR: denies chest pain  GI: denies abdominal pain  MUSCULOSKELETAL: denies back pain, joint pains   PSYCH: denies change in behavior or mood, denies feeling sad or depressed    EXAM  /82   Pulse 78   Resp 16   Ht 5' 7.5\" (1.715 m)   Wt 256 lb (116.1 kg)   BMI 39.50 kg/m²       GENERAL: well developed, well nourished, in no apparent distress, A/O x3  SKIN: no rashes, no suspicious lesions  HEENT:  atraumatic, normocephalic, OP-clear, PERRLA  NECK: supple, no adenopathy  LUNGS: CTA in all fields, breathing non labored  CARDIO: RRR without murmur  GI: +BS, NT/ND, no masses or HSM  EXTREMITIES: no cyanosis, no clubbing, no edema    Lab Results   Component Value Date     (H) 08/15/2023    BUN 11 08/15/2023    BUNCREA 12.4 12/21/2020    CREATSERUM 1.07 08/15/2023    ANIONGAP 2 08/15/2023    GFRNAA 83 08/01/2022    GFRAA 96 08/01/2022    CA 9.5 08/15/2023    OSMOCALC 286 08/15/2023    ALKPHO 72 08/15/2023    ALKPHO 72 08/15/2023    AST 19 08/15/2023    AST 19 08/15/2023    ALT 47 08/15/2023    ALT 47 08/15/2023    BILT 0.6 08/15/2023    BILT 0.6 08/15/2023    TP 7.2 08/15/2023    TP 7.2 08/15/2023    ALB 3.9 08/15/2023    ALB 3.9 08/15/2023    GLOBULIN 3.3 08/15/2023     08/15/2023    K 4.1 08/15/2023     08/15/2023    CO2 31.0 08/15/2023     Lab Results   Component Value Date     08/29/2023    A1C 5.3 08/29/2023     Lab Results   Component Value Date    CHOLEST 142 08/15/2023    TRIG 135 08/15/2023    HDL 40 08/15/2023    LDL 78 08/15/2023    VLDL 21 08/15/2023    NONHDLC 102 08/15/2023     Lab Results   Component Value Date    B12 402 10/24/2022     Lab Results   Component Value Date    VITD 20.2 (L) 10/24/2022       Current Outpatient Medications on File Prior to Visit   Medication Sig Dispense Refill    lisdexamfetamine (VYVANSE) 30 MG Oral Cap Take 1 capsule (30 mg total) by mouth daily. 30 capsule 0    [START ON 1/11/2024] lisdexamfetamine (VYVANSE) 30 MG Oral Cap Take 1 capsule (30 mg total) by mouth daily. 30 capsule 0    lisdexamfetamine (VYVANSE) 30 MG Oral Cap Take 1 capsule (30 mg total) by mouth every morning. 30 capsule 0    allopurinol 300 MG Oral Tab Take 1 tablet (300 mg total) by mouth daily. 90 tablet 0    atorvastatin 20 MG Oral Tab Take 1 tablet (20 mg total) by mouth daily. 90 tablet 0    lisinopril-hydroCHLOROthiazide 20-12.5 MG Oral Tab Take 1 tablet by mouth daily.  90 tablet 0     No current facility-administered medications on file prior to visit.       ASSESSMENT/PLAN    ICD-10-CM    1. Therapeutic drug monitoring  Z51.81       2. Obesity, Class II, BMI 35-39.9  E66.9       3. Vitamin D deficiency  E55.9       4. Essential hypertension  I10       5. Binge eating  R63.2       6. Prediabetes  R73.03       7. Dyslipidemia  E78.5       8. Elevated liver enzymes  R74.8       9. STEWART on CPAP  G47.33           PLAN   Initial Weight Data and Goal Weight Loss:  Initial consult: #284 lbs on 10/2022  Weight Calculations  Initial Weight: 284 lbs  Initial Weight Date: 10/01/22  Today's Weight: 256 lbs  5% Goal: 14.2  10% Goal: 28.4  Total Weight Loss: 28 lbs  Total weight loss: up #15 lbs total, Net loss 28 lbs  Continue with medications: vyvnase 30mg (is going to think about possibly increase this or staying at the same dose)  Is going to check on zepbound (and if not covered by insurance, might pay for it out of pocket)   --advised of side effects and adverse effects of this medication  Contradictions: phentermine, ozempic (not covered by insurance anymore)  Reviewed labs  Binge eating- will continue with vyvanse    Will continue with vitamin d OTC  HTN stable, will continue to monitor, managed by PCP  HLD (HDL low), lifestyle education done, follows with PCP  Fatty liver, lifestyle education done  STEWART- continue with CPAP machine nightly  Hx Prediabetes, last a1c 5.3% on 8/2023  C/o of knee pain, encouraged aqua therapy   Wrote out macros again  Nutrition: Low carb diet, recommended to eat breakfast daily/ regular protein intake  Follow up with dietitian and psychologist as recommended.  Discussed the role of sleep and stress in weight management.  Counseled on comprehensive weight loss plan including attention to nutrition, exercise and behavior/stress management for success. See patient instruction below for more details.  Discussed strategies to overcome barriers to successful weight  loss and weight maintenance  FITTE: ACSM recommendations (150-300 minutes/ week in active weight loss)   Weight Loss Consent to treat reviewed and signed.    Total time spent on chart review, pre-charting, obtaining history, counseling, and educating, reviewing labs was 26 minutes.       NOTE TO PATIENT: The 21st Century Cures Act makes clinical notes like these available to patients in the interest of transparency. Clinical notes are medical documents used by physicians and care providers to communicate with each other. These documents include medical language and terminology, abbreviations, and treatment information that may sound technical and at times possibly unfamiliar. In addition, at times, the verbiage may appear blunt or direct. These documents are one tool providers use to communicate relevant information and clinical opinions of the care providers in a way that allows common understanding of the clinical context.     There are no Patient Instructions on file for this visit.    No follow-ups on file.    Patient verbalizes understanding.    Diana Powers, APRN         Admitted

## 2024-03-01 NOTE — CONSULT NOTE ADULT - PROVIDER SPECIALTY LIST ADULT
[facility] EMERGENCY DEPT  EMERGENCY DEPARTMENT HISTORY AND PHYSICAL EXAM      Date: 3/1/2024  Patient Name: Pretty Higgins  MRN: [id number]  Birthdate 1962  Date of evaluation: 3/1/2024  Provider: Kareem Guaman MD   Note Started: 2:42 PM EST 3/1/24    HISTORY OF PRESENT ILLNESS     Chief Complaint   Patient presents with    Eye Problem       History Provided By: Patient    HPI: Pretty Higgins is a 61 y.o. female with no chronic past medical history of note comes to the ED with chief complaint of visual changes.  She report that she has been noting visual changes for approximately a week and went to the clinic in which she had a fingerstick done showing that she has elevated blood sugar.  She does not have any pre-existing diagnosis of diabetes.  She noted that she has been having mild headache at the end of the day from straining her eyes but otherwise no other symptoms with exception of increased thirst and increased urination.  There is no increased appetite.  Does not have any chest pain, shortness of breath, abdominal pain, or any other specific complaints today.    PAST MEDICAL HISTORY   Past Medical History:  No past medical history on file.    Past Surgical History:  No past surgical history on file.    Family History:  No family history on file.    Social History:       Allergies:  No Known Allergies    PCP: No primary care provider on file.    Current Meds:   No current facility-administered medications for this encounter.     Current Outpatient Medications   Medication Sig Dispense Refill    metFORMIN (GLUCOPHAGE) 500 MG tablet Take 1 tablet by mouth 2 times daily (with meals) 60 tablet 0    glucose monitoring kit 1 kit by Does not apply route daily 1 kit 0    blood glucose monitor strips Test 3 times a day & as needed for symptoms of irregular blood glucose. Dispense sufficient amount for indicated testing frequency plus additional to accommodate PRN testing needs. 90 strip 0    Blood Pressure 
Cardiology
Neurology
Endocrinology

## 2024-03-12 ENCOUNTER — INPATIENT (INPATIENT)
Facility: HOSPITAL | Age: 69
LOS: 3 days | Discharge: HOME | DRG: 638 | End: 2024-03-16
Attending: INTERNAL MEDICINE | Admitting: INTERNAL MEDICINE
Payer: MEDICARE

## 2024-03-12 VITALS
TEMPERATURE: 98 F | HEART RATE: 80 BPM | RESPIRATION RATE: 18 BRPM | DIASTOLIC BLOOD PRESSURE: 96 MMHG | OXYGEN SATURATION: 98 % | HEIGHT: 66 IN | SYSTOLIC BLOOD PRESSURE: 149 MMHG | WEIGHT: 145.06 LBS

## 2024-03-12 DIAGNOSIS — R41.82 ALTERED MENTAL STATUS, UNSPECIFIED: ICD-10-CM

## 2024-03-12 LAB
ALBUMIN SERPL ELPH-MCNC: 4.3 G/DL — SIGNIFICANT CHANGE UP (ref 3.3–5)
ALP SERPL-CCNC: 194 U/L — HIGH (ref 40–120)
ALT FLD-CCNC: 26 U/L — SIGNIFICANT CHANGE UP (ref 10–45)
ANION GAP SERPL CALC-SCNC: 13 MMOL/L — SIGNIFICANT CHANGE UP (ref 5–17)
APPEARANCE UR: CLEAR — SIGNIFICANT CHANGE UP
AST SERPL-CCNC: 11 U/L — SIGNIFICANT CHANGE UP (ref 10–40)
B-OH-BUTYR SERPL-SCNC: 0.6 MMOL/L — HIGH
BACTERIA # UR AUTO: NEGATIVE /HPF — SIGNIFICANT CHANGE UP
BASE EXCESS BLDV CALC-SCNC: 6.3 MMOL/L — HIGH (ref -2–3)
BASOPHILS # BLD AUTO: 0.04 K/UL — SIGNIFICANT CHANGE UP (ref 0–0.2)
BASOPHILS # BLD AUTO: 0.04 K/UL — SIGNIFICANT CHANGE UP (ref 0–0.2)
BASOPHILS NFR BLD AUTO: 0.5 % — SIGNIFICANT CHANGE UP (ref 0–2)
BASOPHILS NFR BLD AUTO: 0.5 % — SIGNIFICANT CHANGE UP (ref 0–2)
BILIRUB SERPL-MCNC: 1 MG/DL — SIGNIFICANT CHANGE UP (ref 0.2–1.2)
BILIRUB UR-MCNC: NEGATIVE — SIGNIFICANT CHANGE UP
BUN SERPL-MCNC: 17 MG/DL — SIGNIFICANT CHANGE UP (ref 7–23)
CA-I SERPL-SCNC: 1.22 MMOL/L — SIGNIFICANT CHANGE UP (ref 1.15–1.33)
CALCIUM SERPL-MCNC: 10.1 MG/DL — SIGNIFICANT CHANGE UP (ref 8.4–10.5)
CAST: 3 /LPF — SIGNIFICANT CHANGE UP (ref 0–4)
CHLORIDE BLDV-SCNC: 93 MMOL/L — LOW (ref 96–108)
CHLORIDE SERPL-SCNC: 94 MMOL/L — LOW (ref 96–108)
CO2 BLDV-SCNC: 35 MMOL/L — HIGH (ref 22–26)
CO2 SERPL-SCNC: 30 MMOL/L — SIGNIFICANT CHANGE UP (ref 22–31)
COLOR SPEC: YELLOW — SIGNIFICANT CHANGE UP
CREAT SERPL-MCNC: 0.9 MG/DL — SIGNIFICANT CHANGE UP (ref 0.5–1.3)
DIFF PNL FLD: NEGATIVE — SIGNIFICANT CHANGE UP
EGFR: 94 ML/MIN/1.73M2 — SIGNIFICANT CHANGE UP
EOSINOPHIL # BLD AUTO: 0.03 K/UL — SIGNIFICANT CHANGE UP (ref 0–0.5)
EOSINOPHIL # BLD AUTO: 0.05 K/UL — SIGNIFICANT CHANGE UP (ref 0–0.5)
EOSINOPHIL NFR BLD AUTO: 0.4 % — SIGNIFICANT CHANGE UP (ref 0–6)
EOSINOPHIL NFR BLD AUTO: 0.6 % — SIGNIFICANT CHANGE UP (ref 0–6)
FLUAV AG NPH QL: SIGNIFICANT CHANGE UP
FLUBV AG NPH QL: SIGNIFICANT CHANGE UP
GAS PNL BLDV: 131 MMOL/L — LOW (ref 136–145)
GAS PNL BLDV: SIGNIFICANT CHANGE UP
GAS PNL BLDV: SIGNIFICANT CHANGE UP
GLUCOSE BLDV-MCNC: 357 MG/DL — HIGH (ref 70–99)
GLUCOSE SERPL-MCNC: 331 MG/DL — HIGH (ref 70–99)
GLUCOSE UR QL: >=1000 MG/DL
HCO3 BLDV-SCNC: 33 MMOL/L — HIGH (ref 22–29)
HCT VFR BLD CALC: 51.8 % — HIGH (ref 39–50)
HCT VFR BLD CALC: 54.5 % — HIGH (ref 39–50)
HCT VFR BLDA CALC: 56 % — HIGH (ref 39–51)
HGB BLD CALC-MCNC: 18.6 G/DL — HIGH (ref 12.6–17.4)
HGB BLD-MCNC: 16.8 G/DL — SIGNIFICANT CHANGE UP (ref 13–17)
HGB BLD-MCNC: 17.9 G/DL — HIGH (ref 13–17)
IMM GRANULOCYTES NFR BLD AUTO: 0.7 % — SIGNIFICANT CHANGE UP (ref 0–0.9)
IMM GRANULOCYTES NFR BLD AUTO: 0.8 % — SIGNIFICANT CHANGE UP (ref 0–0.9)
KETONES UR-MCNC: ABNORMAL MG/DL
LACTATE BLDV-MCNC: 1.7 MMOL/L — SIGNIFICANT CHANGE UP (ref 0.5–2)
LEUKOCYTE ESTERASE UR-ACNC: NEGATIVE — SIGNIFICANT CHANGE UP
LYMPHOCYTES # BLD AUTO: 2.09 K/UL — SIGNIFICANT CHANGE UP (ref 1–3.3)
LYMPHOCYTES # BLD AUTO: 2.23 K/UL — SIGNIFICANT CHANGE UP (ref 1–3.3)
LYMPHOCYTES # BLD AUTO: 26.5 % — SIGNIFICANT CHANGE UP (ref 13–44)
LYMPHOCYTES # BLD AUTO: 27 % — SIGNIFICANT CHANGE UP (ref 13–44)
MAGNESIUM SERPL-MCNC: 1.6 MG/DL — SIGNIFICANT CHANGE UP (ref 1.6–2.6)
MCHC RBC-ENTMCNC: 26.8 PG — LOW (ref 27–34)
MCHC RBC-ENTMCNC: 26.9 PG — LOW (ref 27–34)
MCHC RBC-ENTMCNC: 32.4 GM/DL — SIGNIFICANT CHANGE UP (ref 32–36)
MCHC RBC-ENTMCNC: 32.8 GM/DL — SIGNIFICANT CHANGE UP (ref 32–36)
MCV RBC AUTO: 81.8 FL — SIGNIFICANT CHANGE UP (ref 80–100)
MCV RBC AUTO: 82.7 FL — SIGNIFICANT CHANGE UP (ref 80–100)
MONOCYTES # BLD AUTO: 0.59 K/UL — SIGNIFICANT CHANGE UP (ref 0–0.9)
MONOCYTES # BLD AUTO: 0.61 K/UL — SIGNIFICANT CHANGE UP (ref 0–0.9)
MONOCYTES NFR BLD AUTO: 7.2 % — SIGNIFICANT CHANGE UP (ref 2–14)
MONOCYTES NFR BLD AUTO: 7.7 % — SIGNIFICANT CHANGE UP (ref 2–14)
NEUTROPHILS # BLD AUTO: 5.05 K/UL — SIGNIFICANT CHANGE UP (ref 1.8–7.4)
NEUTROPHILS # BLD AUTO: 5.28 K/UL — SIGNIFICANT CHANGE UP (ref 1.8–7.4)
NEUTROPHILS NFR BLD AUTO: 64 % — SIGNIFICANT CHANGE UP (ref 43–77)
NEUTROPHILS NFR BLD AUTO: 64.1 % — SIGNIFICANT CHANGE UP (ref 43–77)
NITRITE UR-MCNC: NEGATIVE — SIGNIFICANT CHANGE UP
NRBC # BLD: 0 /100 WBCS — SIGNIFICANT CHANGE UP (ref 0–0)
NRBC # BLD: 0 /100 WBCS — SIGNIFICANT CHANGE UP (ref 0–0)
PCO2 BLDV: 52 MMHG — SIGNIFICANT CHANGE UP (ref 42–55)
PH BLDV: 7.41 — SIGNIFICANT CHANGE UP (ref 7.32–7.43)
PH UR: 5 — SIGNIFICANT CHANGE UP (ref 5–8)
PHOSPHATE SERPL-MCNC: 3.5 MG/DL — SIGNIFICANT CHANGE UP (ref 2.5–4.5)
PLATELET # BLD AUTO: 278 K/UL — SIGNIFICANT CHANGE UP (ref 150–400)
PLATELET # BLD AUTO: 315 K/UL — SIGNIFICANT CHANGE UP (ref 150–400)
PO2 BLDV: 24 MMHG — LOW (ref 25–45)
POTASSIUM BLDV-SCNC: 4.4 MMOL/L — SIGNIFICANT CHANGE UP (ref 3.5–5.1)
POTASSIUM SERPL-MCNC: 4.4 MMOL/L — SIGNIFICANT CHANGE UP (ref 3.5–5.3)
POTASSIUM SERPL-SCNC: 4.4 MMOL/L — SIGNIFICANT CHANGE UP (ref 3.5–5.3)
PROT SERPL-MCNC: 8.3 G/DL — SIGNIFICANT CHANGE UP (ref 6–8.3)
PROT UR-MCNC: 100 MG/DL
RBC # BLD: 6.26 M/UL — HIGH (ref 4.2–5.8)
RBC # BLD: 6.66 M/UL — HIGH (ref 4.2–5.8)
RBC # FLD: 15 % — HIGH (ref 10.3–14.5)
RBC # FLD: 15.6 % — HIGH (ref 10.3–14.5)
RBC CASTS # UR COMP ASSIST: 0 /HPF — SIGNIFICANT CHANGE UP (ref 0–4)
RSV RNA NPH QL NAA+NON-PROBE: SIGNIFICANT CHANGE UP
SAO2 % BLDV: 37.6 % — LOW (ref 67–88)
SARS-COV-2 RNA SPEC QL NAA+PROBE: SIGNIFICANT CHANGE UP
SODIUM SERPL-SCNC: 137 MMOL/L — SIGNIFICANT CHANGE UP (ref 135–145)
SP GR SPEC: 1.03 — SIGNIFICANT CHANGE UP (ref 1–1.03)
SQUAMOUS # UR AUTO: 1 /HPF — SIGNIFICANT CHANGE UP (ref 0–5)
TROPONIN T, HIGH SENSITIVITY RESULT: 34 NG/L — SIGNIFICANT CHANGE UP (ref 0–51)
TROPONIN T, HIGH SENSITIVITY RESULT: 36 NG/L — SIGNIFICANT CHANGE UP (ref 0–51)
TROPONIN T, HIGH SENSITIVITY RESULT: 36 NG/L — SIGNIFICANT CHANGE UP (ref 0–51)
TSH SERPL-MCNC: 1.2 UIU/ML — SIGNIFICANT CHANGE UP (ref 0.27–4.2)
UROBILINOGEN FLD QL: 0.2 MG/DL — SIGNIFICANT CHANGE UP (ref 0.2–1)
WBC # BLD: 7.88 K/UL — SIGNIFICANT CHANGE UP (ref 3.8–10.5)
WBC # BLD: 8.25 K/UL — SIGNIFICANT CHANGE UP (ref 3.8–10.5)
WBC # FLD AUTO: 7.88 K/UL — SIGNIFICANT CHANGE UP (ref 3.8–10.5)
WBC # FLD AUTO: 8.25 K/UL — SIGNIFICANT CHANGE UP (ref 3.8–10.5)
WBC UR QL: 1 /HPF — SIGNIFICANT CHANGE UP (ref 0–5)

## 2024-03-12 PROCEDURE — 99285 EMERGENCY DEPT VISIT HI MDM: CPT

## 2024-03-12 PROCEDURE — 70450 CT HEAD/BRAIN W/O DYE: CPT | Mod: 26,MC

## 2024-03-12 PROCEDURE — 71046 X-RAY EXAM CHEST 2 VIEWS: CPT | Mod: 26

## 2024-03-12 RX ORDER — VERAPAMIL HCL 240 MG
120 CAPSULE, EXTENDED RELEASE PELLETS 24 HR ORAL DAILY
Refills: 0 | Status: DISCONTINUED | OUTPATIENT
Start: 2024-03-12 | End: 2024-03-16

## 2024-03-12 RX ORDER — RIVAROXABAN 15 MG-20MG
10 KIT ORAL
Refills: 0 | Status: DISCONTINUED | OUTPATIENT
Start: 2024-03-12 | End: 2024-03-12

## 2024-03-12 RX ORDER — ATENOLOL 25 MG/1
50 TABLET ORAL DAILY
Refills: 0 | Status: DISCONTINUED | OUTPATIENT
Start: 2024-03-12 | End: 2024-03-16

## 2024-03-12 RX ORDER — SODIUM CHLORIDE 9 MG/ML
1000 INJECTION INTRAMUSCULAR; INTRAVENOUS; SUBCUTANEOUS ONCE
Refills: 0 | Status: COMPLETED | OUTPATIENT
Start: 2024-03-12 | End: 2024-03-12

## 2024-03-12 RX ORDER — DILTIAZEM HCL 120 MG
30 CAPSULE, EXT RELEASE 24 HR ORAL ONCE
Refills: 0 | Status: COMPLETED | OUTPATIENT
Start: 2024-03-12 | End: 2024-03-12

## 2024-03-12 RX ORDER — RIVAROXABAN 15 MG-20MG
20 KIT ORAL
Refills: 0 | Status: DISCONTINUED | OUTPATIENT
Start: 2024-03-12 | End: 2024-03-16

## 2024-03-12 RX ORDER — PANTOPRAZOLE SODIUM 20 MG/1
40 TABLET, DELAYED RELEASE ORAL
Refills: 0 | Status: DISCONTINUED | OUTPATIENT
Start: 2024-03-12 | End: 2024-03-16

## 2024-03-12 RX ORDER — VERAPAMIL HCL 240 MG
120 CAPSULE, EXTENDED RELEASE PELLETS 24 HR ORAL DAILY
Refills: 0 | Status: DISCONTINUED | OUTPATIENT
Start: 2024-03-12 | End: 2024-03-12

## 2024-03-12 RX ORDER — DILTIAZEM HCL 120 MG
10 CAPSULE, EXT RELEASE 24 HR ORAL ONCE
Refills: 0 | Status: COMPLETED | OUTPATIENT
Start: 2024-03-12 | End: 2024-03-12

## 2024-03-12 RX ORDER — INFLUENZA VIRUS VACCINE 15; 15; 15; 15 UG/.5ML; UG/.5ML; UG/.5ML; UG/.5ML
0.7 SUSPENSION INTRAMUSCULAR ONCE
Refills: 0 | Status: DISCONTINUED | OUTPATIENT
Start: 2024-03-12 | End: 2024-03-16

## 2024-03-12 RX ADMIN — ATENOLOL 50 MILLIGRAM(S): 25 TABLET ORAL at 20:49

## 2024-03-12 RX ADMIN — Medication 10 MILLIGRAM(S): at 17:29

## 2024-03-12 RX ADMIN — Medication 30 MILLIGRAM(S): at 17:29

## 2024-03-12 RX ADMIN — SODIUM CHLORIDE 1000 MILLILITER(S): 9 INJECTION INTRAMUSCULAR; INTRAVENOUS; SUBCUTANEOUS at 15:05

## 2024-03-12 RX ADMIN — RIVAROXABAN 20 MILLIGRAM(S): KIT at 20:49

## 2024-03-12 RX ADMIN — SODIUM CHLORIDE 1000 MILLILITER(S): 9 INJECTION INTRAMUSCULAR; INTRAVENOUS; SUBCUTANEOUS at 12:21

## 2024-03-12 NOTE — ED PROVIDER NOTE - NSICDXPASTMEDICALHX_GEN_ALL_CORE_FT
PAST MEDICAL HISTORY:  Chronic atrial fibrillation     Diabetes     History of automatic internal cardiac defibrillator (AICD)

## 2024-03-12 NOTE — ED ADULT NURSE NOTE - OBJECTIVE STATEMENT
Patient is a 68y male presenting to the ED ambulatory from home with c/o confusion. Patient's primary language is Occitan, daughter is at bedside and is preferred . 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 Reports also having generalized weakness and subjective chills. Patient is currently A&Ox4. Patient is speaking coherently. Displays equal strength and movement of bilateral upper and lower extremities. Reports "sharp" left sided chest pain currently Patient is a 68y male presenting to the ED ambulatory from home with c/o confusion. Patient's primary language is Irish, daughter is at bedside and is preferred . 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 Reports also having generalized weakness and subjective chills. Patient is currently A&Ox4. Patient is speaking coherently. Displays equal strength and movement of bilateral upper and lower extremities. Reports "sharp" left sided chest pain currently. Denies SOB, headache, N/V/D, abdominal pain, fever/chills, burning upon urination or difficulty urinating, hematuria currently. Patient is a 68y male presenting to the ED ambulatory from home with c/o confusion. Patient's primary language is Ukrainian, daughter is at bedside and is preferred . 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 Reports also having generalized weakness and subjective chills. Patient is currently A&Ox4. Patient is speaking coherently. Displays equal strength and movement of bilateral upper and lower extremities. Reports "sharp" left sided chest pain currently. Denies SOB, headache, N/V/D, abdominal pain, fever/chills, burning upon urination or difficulty urinating, hematuria currently. Respirations spontaneous, even, and non-labored. Abdomen soft, non-distended and non-tender upon palpation.

## 2024-03-12 NOTE — ED ADULT TRIAGE NOTE - CHIEF COMPLAINT QUOTE
since 3/6 pt has been having periods of confusion, fever and body aches. Endorses chest pain. Denies SOB.

## 2024-03-12 NOTE — ED ADULT NURSE REASSESSMENT NOTE - NS ED NURSE REASSESS COMMENT FT1
Patient resting comfortably in stretcher. Patient A&Ox4. Patient's family is at bedside and is preferred German . Denies chest pain, SOB, headache, N/V/D, abdominal pain, fever/chills currently. Patient pending inpatient telemetry bed assignment. Plan of care discussed. Safety and comfort measures maintained.

## 2024-03-12 NOTE — H&P ADULT - HISTORY OF PRESENT ILLNESS
68-year-old male with past medical history of A-fib (on Xarelto) status post defibrillator placement, diabetes (on Ozempic) p/w Confusion, forgetfulness.   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 Patient was sick with fever couple of days ago, confused,     In the ED, the patient is complaining of sharp localized chest pain over the left side of his chest.  Denies fevers, chills, recent illness, difficulty breathing, abdominal pain, nausea/vomiting/diarrhea, urinary symptoms, rashes or recent known head trauma.    In the ed patient had CT head no acute infract   Patient  noted to have Afib with rvr s/p Cardizem po and iv doses

## 2024-03-12 NOTE — ED ADULT NURSE NOTE - NSFALLUNIVINTERV_ED_ALL_ED
Bed/Stretcher in lowest position, wheels locked, appropriate side rails in place/Call bell, personal items and telephone in reach/Instruct patient to call for assistance before getting out of bed/chair/stretcher/Non-slip footwear applied when patient is off stretcher/[place] to call system/Physically safe environment - no spills, clutter or unnecessary equipment/Purposeful proactive rounding/Room/bathroom lighting operational, light cord in reach

## 2024-03-12 NOTE — ED PROVIDER NOTE - CLINICAL SUMMARY MEDICAL DECISION MAKING FREE TEXT BOX
Tal 82-year-old male history of CHF with AICD history of a fib on Xarelto history of diabetes hypertension high cholesterol presents with low-grade temperature tactile x 1 week as per daughter waxing waning confusion patient complains of intermittent chest pain no nausea no vomiting no diarrhea no pain on urination no cough no shortness of breath no known falls patient is a everyday drinker reports small amount no smoker no drug use on examination GCS is 15 awake oriented x 3 pupils equal round and reactive no aphasia no droop or drift strength 5 out of 5 upper and lower extremity abdomen soft nontender no rashes lungs clear bilaterally hemodynamically stable as patient is intermittently altered less likely intracranial pathology but as patient is on AC will CT brain no focal or lateralizing deficits on exam infectious metabolic workup inclusive of workup for DKA rule out ischemia rule out infection reeval after labs and imaging

## 2024-03-12 NOTE — ED ADULT NURSE NOTE - BEFAST ARM SIDE DRIFT
11/18/2021         RE: Brennan Vazquez  [address] [place] [address]        Dear Colleague,    Thank you for referring your patient, Brennan Vazquez, to the [facility]. Please see a copy of my visit note below.    CHIEF COMPLAINT:  Recheck      HISTORY OF PRESENT ILLNESS    Elvis was seen in follow up for CT review.  Patient returns for CT review after trial of Astelin nasal spray.  His allergy is allergy evaluation was scheduled but is not until next January.  He had minimal improvement with the Astelin nasal spray.  He continues to have positional nasal obstruction right side greater than left.  This disrupts his sleep and interferes with his quality of life.  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  He has had history of hip replacement bilaterally and requires antibiotics before procedures.      CT  Sinus:    1.  Mild nonobstructive inflammatory paranasal sinus disease, as described.  2.  Mild deviation of the nasal septum to the right with a right-sided nasal septal spur.      My last note:    Encounter Diagnoses   Name Primary?     Nasal septal deviation Yes     Nasal congestion       Nasal turbinate hypertrophy        Patient does have facial pressure pain along with the nasal congestion.  Recommend CT of the sinus to further evaluate evaluate for nasal sinonasal obstruction.  Briefly discussed possibility of a balloon assisted septoplasty along with turbinate reduction.  We will discuss this in more detail at his next visit     RECOMMENDATIONS:     CT sinus  Allergy referral  Trial of astelin nasal spray  RTC 1 month to review CT images         REVIEW OF SYSTEMS    Review of Systems: a 10-system review is reviewed at this encounter.  See scanned document.     Patient has no known allergies.     PHYSICAL EXAM:        HEAD: Normal appearance and symmetry:  No cutaneous lesions.      EARS:   Auricles  normal         NOSE:    Dorsum:   straight       ORAL CAVITY/OROPHARYNX:    Lips:  Normal.     NECK:  Trachea:  midline       NEURO:   Alert and Oriented    GAIT AND STATION:  normal     RESPIRATORY:   Symmetry and Respiratory effort    PSYCH:   normal mood and affect    SKIN:  warm and dry         IMPRESSION:   Encounter Diagnoses   Name Primary?     Nasal septal deviation Yes     Nasal turbinate hypertrophy      Chronic rhinitis      Reviewed the CT in detail with Elvis.  There is nasal septal deviation with a bony spur posteriorly on the right as well as significant inferior turbinate hypertrophy.  Patient also significant significant runny nose and postnasal drip.  He would benefit from a balloon assisted nasal septoplasty along with cryoablation of posterior nasal tissue and radiofrequency reduction of inferior turbinates.  Risk benefits of this procedure are discussed including need for further procedures and fail to completely resolve symptoms.  He is agreeable this plan of care.  We will schedule this accordingly.    RECOMMENDATIONS:    Recommend Balloon assisted septoplasty with cryoablation of posterior nasal and RF reduction.  R/B/A  Discussed.       Again, thank you for allowing me to participate in the care of your patient.        Sincerely,        Flavio Odonnell MD     No

## 2024-03-12 NOTE — ED PROVIDER NOTE - CARE PLAN
1 Principal Discharge DX:	AMS (altered mental status)  Secondary Diagnosis:	Diabetes mellitus with hyperglycemia  Secondary Diagnosis:	Chest pain   Principal Discharge DX:	AMS (altered mental status)  Secondary Diagnosis:	Diabetes mellitus with hyperglycemia  Secondary Diagnosis:	Chest pain  Secondary Diagnosis:	Hyponatremia

## 2024-03-12 NOTE — ED PROVIDER NOTE - PHYSICAL EXAMINATION
Gen: well appearing, in no acute distress  Head: normal appearing  HEENT: normal conjunctiva, oral mucosa moist, vision grossly intact, no scleral icterus, EOMI  Lung: no respiratory distress, speaking in full sentences, CTA b/l, no wheeze, crackles or rhonchi   CV: regular rate and rhythm, no murmurs  Abd: soft, non distended, non tender   MSK: no visible deformities, ambulating without difficulty   Neuro: No focal deficits, AAOx3, no pronator drift, 5/5 strength in all extremities, no sensory deficit to light touch   Skin: Warm, no rashes   Psych: normal affect

## 2024-03-12 NOTE — ED PROVIDER NOTE - OBJECTIVE STATEMENT
68-year-old male with past medical history of A-fib (on Xarelto) status post defibrillator placement, diabetes (on Ozempic) presents to emergency department for evaluation by family due to concerns for periods of confusion.  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  Since, has been generally weak.  In the ED, the patient is complaining of sharp localized chest pain over the left side of his chest.  Denies fevers, chills, recent illness, difficulty breathing, abdominal pain, nausea/vomiting/diarrhea, urinary symptoms, rashes or recent known head trauma. Was seen by PMD yesterday who referred patient to "specialist for memory loss".  Daughter also notes patient has had 2 prior hospitalizations where he had "paralysis secondary to very high blood sugar". Former smoker. Endorsing occasional EtOH use.

## 2024-03-12 NOTE — H&P ADULT - ASSESSMENT
68-year-old male with past medical history of A-fib (on Xarelto) status post defibrillator placement, diabetes (on Ozempic) p/w Confusion, forgetfulness.       Acute encephalopathy   Dehydration/ viral illness with fevers 2 days ago / r/o CVA   Afib with rvr   CAD     iv fluids   Rate control with Cardiizem and Atenolol   Cards famal called   MRI Brain     DM type 2 HISS  A1C

## 2024-03-12 NOTE — ED PROVIDER NOTE - PROGRESS NOTE DETAILS
nml ph - not on insulin - not dka -- pt found to be hyponatremic and hemoconcentrated likely acute dehydration cmp na unremarkable 137 will repeat cbc post hydration , awaitng repeat fs after fluids and ct brain pt without fever- infectious and metabolic w/u unremarkable- will dw pt and family re - fu with neurolgy outpt mri and strict return precautions ct no hms - bifrontal atrophy age appropriate - Elena PGY1: CT results discussed with patient and daughter, patient has outpatient neurology appt within next 2 weeks. initial EKG afib with RVR, repeat also afib RVR to rate 110. fluids and repeat trop ordered. hr still mildly elv will give 2ndliter- and reval Elena PGY1: s/p 2nd L, patient still in afib with RVR, anywhere from 105 to 135. per daughter, missed morning dose of Verapamil and Xarelto, otherwise compliant with medications. does not have a cardiologist he can follow up with as his retired. will admit on tele

## 2024-03-13 ENCOUNTER — TRANSCRIPTION ENCOUNTER (OUTPATIENT)
Age: 69
End: 2024-03-13

## 2024-03-13 LAB
A1C WITH ESTIMATED AVERAGE GLUCOSE RESULT: 11.3 % — HIGH (ref 4–5.6)
CULTURE RESULTS: SIGNIFICANT CHANGE UP
ESTIMATED AVERAGE GLUCOSE: 278 MG/DL — HIGH (ref 68–114)
GLUCOSE BLDC GLUCOMTR-MCNC: 183 MG/DL — HIGH (ref 70–99)
GLUCOSE BLDC GLUCOMTR-MCNC: 230 MG/DL — HIGH (ref 70–99)
GLUCOSE BLDC GLUCOMTR-MCNC: 258 MG/DL — HIGH (ref 70–99)
GLUCOSE BLDC GLUCOMTR-MCNC: 280 MG/DL — HIGH (ref 70–99)
GLUCOSE BLDC GLUCOMTR-MCNC: 396 MG/DL — HIGH (ref 70–99)
SPECIMEN SOURCE: SIGNIFICANT CHANGE UP

## 2024-03-13 PROCEDURE — 93283 PRGRMG EVAL IMPLANTABLE DFB: CPT | Mod: 26

## 2024-03-13 PROCEDURE — 71045 X-RAY EXAM CHEST 1 VIEW: CPT | Mod: 26

## 2024-03-13 RX ORDER — SODIUM CHLORIDE 9 MG/ML
1000 INJECTION, SOLUTION INTRAVENOUS
Refills: 0 | Status: DISCONTINUED | OUTPATIENT
Start: 2024-03-13 | End: 2024-03-16

## 2024-03-13 RX ORDER — VERAPAMIL HCL 240 MG
1 CAPSULE, EXTENDED RELEASE PELLETS 24 HR ORAL
Qty: 0 | Refills: 0 | DISCHARGE

## 2024-03-13 RX ORDER — DEXTROSE 50 % IN WATER 50 %
25 SYRINGE (ML) INTRAVENOUS ONCE
Refills: 0 | Status: DISCONTINUED | OUTPATIENT
Start: 2024-03-13 | End: 2024-03-16

## 2024-03-13 RX ORDER — RIVAROXABAN 15 MG-20MG
1 KIT ORAL
Qty: 0 | Refills: 0 | DISCHARGE

## 2024-03-13 RX ORDER — GLIMEPIRIDE 1 MG
1 TABLET ORAL
Refills: 0 | DISCHARGE

## 2024-03-13 RX ORDER — SITAGLIPTIN AND METFORMIN HYDROCHLORIDE 500; 50 MG/1; MG/1
1 TABLET, FILM COATED ORAL
Qty: 0 | Refills: 0 | DISCHARGE

## 2024-03-13 RX ORDER — DEXTROSE 50 % IN WATER 50 %
12.5 SYRINGE (ML) INTRAVENOUS ONCE
Refills: 0 | Status: DISCONTINUED | OUTPATIENT
Start: 2024-03-13 | End: 2024-03-16

## 2024-03-13 RX ORDER — MAGNESIUM OXIDE 400 MG ORAL TABLET 241.3 MG
1 TABLET ORAL
Refills: 0 | DISCHARGE

## 2024-03-13 RX ORDER — LORATADINE 10 MG/1
1 TABLET ORAL
Qty: 0 | Refills: 0 | DISCHARGE

## 2024-03-13 RX ORDER — ATENOLOL 25 MG/1
1 TABLET ORAL
Qty: 0 | Refills: 0 | DISCHARGE

## 2024-03-13 RX ORDER — INSULIN LISPRO 100/ML
VIAL (ML) SUBCUTANEOUS AT BEDTIME
Refills: 0 | Status: DISCONTINUED | OUTPATIENT
Start: 2024-03-13 | End: 2024-03-14

## 2024-03-13 RX ORDER — GLUCAGON INJECTION, SOLUTION 0.5 MG/.1ML
1 INJECTION, SOLUTION SUBCUTANEOUS ONCE
Refills: 0 | Status: DISCONTINUED | OUTPATIENT
Start: 2024-03-13 | End: 2024-03-16

## 2024-03-13 RX ORDER — DEXTROSE 50 % IN WATER 50 %
15 SYRINGE (ML) INTRAVENOUS ONCE
Refills: 0 | Status: DISCONTINUED | OUTPATIENT
Start: 2024-03-13 | End: 2024-03-16

## 2024-03-13 RX ORDER — LANSOPRAZOLE 15 MG/1
1 CAPSULE, DELAYED RELEASE ORAL
Refills: 0 | DISCHARGE

## 2024-03-13 RX ORDER — INSULIN LISPRO 100/ML
VIAL (ML) SUBCUTANEOUS
Refills: 0 | Status: DISCONTINUED | OUTPATIENT
Start: 2024-03-13 | End: 2024-03-14

## 2024-03-13 RX ADMIN — PANTOPRAZOLE SODIUM 40 MILLIGRAM(S): 20 TABLET, DELAYED RELEASE ORAL at 05:12

## 2024-03-13 RX ADMIN — ATENOLOL 50 MILLIGRAM(S): 25 TABLET ORAL at 05:14

## 2024-03-13 RX ADMIN — Medication 3: at 11:48

## 2024-03-13 RX ADMIN — Medication 1: at 08:30

## 2024-03-13 RX ADMIN — Medication 120 MILLIGRAM(S): at 05:12

## 2024-03-13 RX ADMIN — RIVAROXABAN 20 MILLIGRAM(S): KIT at 17:35

## 2024-03-13 RX ADMIN — Medication 2: at 17:35

## 2024-03-13 RX ADMIN — Medication 3: at 01:02

## 2024-03-13 RX ADMIN — Medication 1: at 21:49

## 2024-03-13 NOTE — PROCEDURE NOTE - ADDITIONAL PROCEDURE DETAILS
1) Indication for interrogation: Routine check prior to MRI (r/o CVA)  2) Presenting rhythm: Afib with occasional V pacing, HR 60-70's  3) Measured data WNL, normal ICD function, Pt is not pacemaker dependent  4) Since 1/30/2020: A pace ~0.2%, V pace 27.3%, AT/AF burden 78.2%  5) Stored data revealed patient has been in longstanding persistent Afib since 12/28/2020. On 3/5/2024 patient was treated with one round of ATP for AF with RVR with average ventricular heart rate at 182 bpm (max  bpm). No ICD shock noted.   6) Patient's ICD is MRI compatible. MRI clearance form filled out and placed in pt's physical chart.     GRAHAM Cabrera, NP-C  [id number] 1) Indication for interrogation: Routine check prior to MRI (r/o CVA)  2) Presenting rhythm: Afib with occasional V pacing, HR 60-70's  3) Measured data WNL, normal ICD function, Pt is not pacemaker dependent  4) Since 1/30/2020: A pace ~0.2%, V pace 27.3%, AT/AF burden 78.2%  5) Stored data revealed patient has been in longstanding persistent Afib since 12/28/2020. On 3/5/2024 patient was treated with one round of ATP for AF with RVR with average ventricular heart rate at 182 bpm (max  bpm). No ICD shock noted.   6) The patient's ICD model is part of a [employer] advisory in which there is a potential for reduced energy or no energy delivered during high voltage therapy when programmed AX>B configuration. The device therapies have therefore been reprogrammed in the B>AX configuration as advised by [employer].  7) Patient's ICD is MRI compatible. MRI clearance form filled out and placed in pt's physical chart.     GRAHAM Cabrera, NP-C  [id number]

## 2024-03-13 NOTE — PROCEDURE NOTE - PROCEDURE DATE TIME, MLM
Patient's son Toby is HCP, will provide for the chart.  [facility]ST indicating DNR/DNI on file, hospice referral placed,  appears most appropriate for home hospice. Discussed w primary team, MARTHA. Palliative will follow. 13-Mar-2024 17:44

## 2024-03-13 NOTE — PHARMACOTHERAPY INTERVENTION NOTE - COMMENTS
Performed medication reconciliation and home medication list updated in prescription writer/outpatient medication review. Medications verified with patient and [facility] pharmacy.     Home Medications:  atenolol 50 mg oral tablet: 1 tab(s) orally 2 times a day  glimepiride 2 mg oral tablet: 1 tab(s) orally 2 times a day  irbesartan 300 mg oral tablet: 1 tab(s) orally once a day  Janumet 50 mg-1000 mg oral tablet: 1 tab(s) orally 2 times a day  lansoprazole 30 mg oral delayed release capsule: 1 cap(s) orally once a day  levocetirizine 5 mg oral tablet: 1 tab(s) orally once a day  magnesium oxide 400 mg oral tablet: 1 tab(s) orally once a day  pravastatin 40 mg oral tablet: 1 tab(s) orally once a day  semaglutide 1 mg/0.5 mL (1 mg dose) subcutaneous solution: 1 milligram(s) subcutaneously once a week  verapamil 120 mg/24 hours oral capsule, extended release: 1 cap(s) orally once a day  Xarelto 20 mg oral tablet: 1 tab(s) orally once a day (in the evening)    Recommendations:  Add an equivalent statin to pravastatin 40 mg (available but non-formulary)  Add irbesartan 300 mg equivalent for blood pressure  Add magensium oxide 400 mg once daily  Change atenolol 50 mg from 1 tab once a day to twice daily.      Alin (Nikolas Gooden  Transitions of Care Pharmacist  Available on Microsoft Teams (Preferred)  Spectra: [phone]

## 2024-03-13 NOTE — PROVIDER CONTACT NOTE (OTHER) - ACTION/TREATMENT ORDERED:
Ok to notify [facility] to change AFib alert to informational with the exception if HR sustains >130s. Continuous monitoring in progress. Call bell within reach and safety maintained.

## 2024-03-13 NOTE — PROGRESS NOTE ADULT - SUBJECTIVE AND OBJECTIVE BOX
Patient is a 68y old  Male who presents with a chief complaint of AMS x 2 days (13 Mar 2024 18:05)      SUBJECTIVE / OVERNIGHT EVENTS:     MEDICATIONS  (STANDING):  atenolol  Tablet 50 milliGRAM(s) Oral daily  dextrose 5%. 1000 milliLiter(s) (100 mL/Hr) IV Continuous <Continuous>  dextrose 5%. 1000 milliLiter(s) (50 mL/Hr) IV Continuous <Continuous>  dextrose 50% Injectable 25 Gram(s) IV Push once  dextrose 50% Injectable 25 Gram(s) IV Push once  dextrose 50% Injectable 12.5 Gram(s) IV Push once  glucagon  Injectable 1 milliGRAM(s) IntraMuscular once  influenza  Vaccine (HIGH DOSE) 0.7 milliLiter(s) IntraMuscular once  insulin lispro (ADMELOG) corrective regimen sliding scale   SubCutaneous three times a day before meals  insulin lispro (ADMELOG) corrective regimen sliding scale   SubCutaneous at bedtime  pantoprazole    Tablet 40 milliGRAM(s) Oral before breakfast  rivaroxaban 20 milliGRAM(s) Oral with dinner  verapamil  milliGRAM(s) Oral daily    MEDICATIONS  (PRN):  dextrose Oral Gel 15 Gram(s) Oral once PRN Blood Glucose LESS THAN 70 milliGRAM(s)/deciliter      CAPILLARY BLOOD GLUCOSE      POCT Blood Glucose.: 280 mg/dL (13 Mar 2024 21:41)  POCT Blood Glucose.: 230 mg/dL (13 Mar 2024 17:15)  POCT Blood Glucose.: 258 mg/dL (13 Mar 2024 11:26)  POCT Blood Glucose.: 183 mg/dL (13 Mar 2024 07:37)  POCT Blood Glucose.: 396 mg/dL (13 Mar 2024 00:45)    I&O's Summary    13 Mar 2024 07:01  -  13 Mar 2024 23:48  --------------------------------------------------------  IN: 480 mL / OUT: 0 mL / NET: 480 mL      T(C): 36.7 (24 @ 21:06), Max: 36.7 (24 @ 21:06)  HR: 75 (03-13-24 @ 21:06) (75 - 77)  BP: 152/96 (24 @ 21:06) (152/96 - 159/104)  RR: 18 (24 @ 21:06) (18 - 18)  SpO2: 96% (24 @ 21:06) (96% - 97%)    PHYSICAL EXAM:  GENERAL: NAD, well-developed  HEAD:  Atraumatic, Normocephalic  EYES: EOMI, PERRLA, conjunctiva and sclera clear  NECK: Supple, No JVD  CHEST/LUNG: Clear to auscultation bilaterally; No wheeze  HEART: Regular rate and rhythm; No murmurs, rubs, or gallops  ABDOMEN: Soft, Nontender, Nondistended; Bowel sounds present  EXTREMITIES:  2+ Peripheral Pulses, No clubbing, cyanosis, or edema  PSYCH: AAOx3  NEUROLOGY: non-focal  SKIN: No rashes or lesions    LABS:                        16.8   8.25  )-----------( 278      ( 12 Mar 2024 14:26 )             51.8     -    137  |  94<L>  |  17  ----------------------------<  331<H>  4.4   |  30  |  0.90    Ca    10.1      12 Mar 2024 12:31  Phos  3.5     -  Mg     1.6         TPro  8.3  /  Alb  4.3  /  TBili  1.0  /  DBili  x   /  AST  11  /  ALT  26  /  AlkPhos  194<H>  12          Urinalysis Basic - ( 12 Mar 2024 12:32 )    Color: Yellow / Appearance: Clear / S.030 / pH: x  Gluc: x / Ketone: Trace mg/dL  / Bili: Negative / Urobili: 0.2 mg/dL   Blood: x / Protein: 100 mg/dL / Nitrite: Negative   Leuk Esterase: Negative / RBC: 0 /HPF / WBC 1 /HPF   Sq Epi: x / Non Sq Epi: 1 /HPF / Bacteria: Negative /HPF        RADIOLOGY & ADDITIONAL TESTS:    Imaging Personally Reviewed:    Consultant(s) Notes Reviewed:      Care Discussed with Consultants/Other Providers:

## 2024-03-13 NOTE — CONSULT NOTE ADULT - SUBJECTIVE AND OBJECTIVE BOX
DATE OF SERVICE: 03-13-24 @ 18:06    CHIEF COMPLAINT:Patient is a 68y old  Male who presents with a chief complaint of AMS x 2 days (12 Mar 2024 19:36)      HISTORY OF PRESENT ILLNESS:HPI:  68-year-old male with past medical history of A-fib (on Xarelto) status post defibrillator placement, diabetes (on Ozempic) p/w Confusion, forgetfulness.   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 Patient was sick with fever couple of days ago, confused,     In the ED, the patient is complaining of sharp localized chest pain over the left side of his chest.  Denies fevers, chills, recent illness, difficulty breathing, abdominal pain, nausea/vomiting/diarrhea, urinary symptoms, rashes or recent known head trauma.    In the ed patient had CT head no acute infract   Patient  noted to have Afib with rvr s/p Cardizem po and iv doses  (12 Mar 2024 19:36)      PAST MEDICAL & SURGICAL HISTORY:  Diabetes      History of automatic internal cardiac defibrillator (AICD)      Chronic atrial fibrillation      No significant past surgical history              MEDICATIONS:  atenolol  Tablet 50 milliGRAM(s) Oral daily  rivaroxaban 20 milliGRAM(s) Oral with dinner  verapamil  milliGRAM(s) Oral daily          pantoprazole    Tablet 40 milliGRAM(s) Oral before breakfast    dextrose 50% Injectable 25 Gram(s) IV Push once  dextrose 50% Injectable 25 Gram(s) IV Push once  dextrose 50% Injectable 12.5 Gram(s) IV Push once  dextrose Oral Gel 15 Gram(s) Oral once PRN  glucagon  Injectable 1 milliGRAM(s) IntraMuscular once  insulin lispro (ADMELOG) corrective regimen sliding scale   SubCutaneous three times a day before meals  insulin lispro (ADMELOG) corrective regimen sliding scale   SubCutaneous at bedtime    dextrose 5%. 1000 milliLiter(s) IV Continuous <Continuous>  dextrose 5%. 1000 milliLiter(s) IV Continuous <Continuous>  influenza  Vaccine (HIGH DOSE) 0.7 milliLiter(s) IntraMuscular once      FAMILY HISTORY:      Non-contributory    SOCIAL HISTORY:    Quit Tobacco 12/24    Allergies    No Known Allergies    Intolerances    	    REVIEW OF SYSTEMS:  CONSTITUTIONAL: No fever  EYES: No eye pain, visual disturbances, or discharge  ENMT:  No difficulty hearing, tinnitus  NECK: No pain or stiffness  RESPIRATORY: No cough, wheezing,  CARDIOVASCULAR: No chest pain, palpitations, passing out, dizziness, or leg swelling  GASTROINTESTINAL:  No nausea, vomiting, diarrhea or constipation. No melena.  GENITOURINARY: No dysuria, hematuria  NEUROLOGICAL: No stroke like symptoms  SKIN: No burning or lesions   ENDOCRINE: No heat or cold intolerance  MUSCULOSKELETAL: No joint pain or swelling  PSYCHIATRIC: No  anxiety, mood swings  HEME/LYMPH: No bleeding gums  ALLERGY AND IMMUNOLOGIC: No hives or eczema	    All other ROS negative    PHYSICAL EXAM:  T(C): 36.4 (03-13-24 @ 17:05), Max: 36.8 (03-12-24 @ 18:55)  HR: 77 (03-13-24 @ 17:05) (66 - 115)  BP: 159/104 (03-13-24 @ 17:05) (137/76 - 159/104)  RR: 18 (03-13-24 @ 17:05) (18 - 18)  SpO2: 97% (03-13-24 @ 17:05) (96% - 99%)  Wt(kg): --  I&O's Summary    13 Mar 2024 07:01  -  13 Mar 2024 18:06  --------------------------------------------------------  IN: 480 mL / OUT: 0 mL / NET: 480 mL        Appearance: Normal	  HEENT:   Normal oral mucosa, EOMI	  Cardiovascular:  S1 S2, No JVD,    Respiratory: Lungs clear to auscultation	  Psychiatry: Alert  Gastrointestinal:  Soft, Non-tender, + BS	  Skin: No rashes   Neurologic: Non-focal  Extremities:  No edema  Vascular: Peripheral pulses palpable    	    	  	  CARDIAC MARKERS:  Labs personally reviewed by me                                  16.8   8.25  )-----------( 278      ( 12 Mar 2024 14:26 )             51.8     03-12    137  |  94<L>  |  17  ----------------------------<  331<H>  4.4   |  30  |  0.90    Ca    10.1      12 Mar 2024 12:31  Phos  3.5     03-12  Mg     1.6     03-12    TPro  8.3  /  Alb  4.3  /  TBili  1.0  /  DBili  x   /  AST  11  /  ALT  26  /  AlkPhos  194<H>  03-12          EKG: Personally reviewed by baljit VENTURA RVR   Radiology: Personally reviewed by me - Clear lungs.      AICD interrogation 3/13/24   1) Indication for interrogation: Routine check prior to MRI (r/o CVA)  2) Presenting rhythm: Afib with occasional V pacing, HR 60-70's  3) Measured data WNL, normal ICD function, Pt is not pacemaker dependent  4) Since 1/30/2020: A pace ~0.2%, V pace 27.3%, AT/AF burden 78.2%  5) Stored data revealed patient has been in longstanding persistent Afib since 12/28/2020. On 3/5/2024 patient was treated with one round of ATP for AF with RVR with average ventricular heart rate at 182 bpm (max  bpm). No ICD shock noted.   6) The patient's ICD model is part of a [employer] advisory in which there is a potential for reduced energy or no energy delivered during high voltage therapy when programmed AX>B configuration. The device therapies have therefore been reprogrammed in the B>AX configuration as advised by [employer].  7) Patient's ICD is MRI compatible. MRI clearance form filled out and placed in pt's physical chart.         Assessment /Plan:   68-year-old male with past medical history of A-fib (on Xarelto) status post defibrillator placement, diabetes (on Ozempic) p/w Confusion, forgetfulness.   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 Patient was sick with fever couple of days ago, confused,     1. Atypical CP  - non exertional CP  - Trop negative x 2   - patient with risk factors: Former smoker, HTN, DM  - check TTE   - patient will need ischemic eval once HR stabilizes     2. Afib s/p AICD  - ICD compatible with MRI  - recently interrogated IH, unremarkable as above   - [id number] score 4  - check TSH  - cont monitor on tele for occult arrythmia   - c/w home Xarelto   - c/w Atenolol     3. Confusion  - possibly due to Ozempic   - MRI Head pending     4. DM  - A1c 11.3  - c/w Insulin as order  - Endo recs appreciated given A1c above 8     5. DVT prophylactic   - c/w Xarelto                 Differential diagnosis and plan of care discussed with patient after the evaluation. Counseling on diet, nutritional counseling, weight management, exercise and medication compliance was done.   Advanced care planning/advanced directives discussed with patient/family. DNR status including forceful chest compressions to attempt to restart the heart, ventilator support/artificial breathing, electric shock, artificial nutrition, health care proxy, Molst form all discussed with pt. Pt wishes to consider. More than fifteen minutes spent on discussing advanced directives.     WILLIAN Ferguson DO [facility]  Cardiovascular Medicine  [address] [place] [address] Dr, [address]  Available for call or text via [employer] TEAMs  Office [phone]   DATE OF SERVICE: 03-13-24 @ 18:06    CHIEF COMPLAINT:Patient is a 68y old  Male who presents with a chief complaint of AMS x 2 days (12 Mar 2024 19:36)      HISTORY OF PRESENT ILLNESS:HPI:  68-year-old male with past medical history of A-fib (on Xarelto) status post defibrillator placement, diabetes (on Ozempic) p/w Confusion, forgetfulness.   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 Patient was sick with fever couple of days ago, confused,     In the ED, the patient is complaining of sharp localized chest pain over the left side of his chest.  Denies fevers, chills, recent illness, difficulty breathing, abdominal pain, nausea/vomiting/diarrhea, urinary symptoms, rashes or recent known head trauma.    In the ed patient had CT head no acute infract   Patient  noted to have Afib with rvr s/p Cardizem po and iv doses  (12 Mar 2024 19:36)      PAST MEDICAL & SURGICAL HISTORY:  Diabetes      History of automatic internal cardiac defibrillator (AICD)      Chronic atrial fibrillation      No significant past surgical history              MEDICATIONS:  atenolol  Tablet 50 milliGRAM(s) Oral daily  rivaroxaban 20 milliGRAM(s) Oral with dinner  verapamil  milliGRAM(s) Oral daily          pantoprazole    Tablet 40 milliGRAM(s) Oral before breakfast    dextrose 50% Injectable 25 Gram(s) IV Push once  dextrose 50% Injectable 25 Gram(s) IV Push once  dextrose 50% Injectable 12.5 Gram(s) IV Push once  dextrose Oral Gel 15 Gram(s) Oral once PRN  glucagon  Injectable 1 milliGRAM(s) IntraMuscular once  insulin lispro (ADMELOG) corrective regimen sliding scale   SubCutaneous three times a day before meals  insulin lispro (ADMELOG) corrective regimen sliding scale   SubCutaneous at bedtime    dextrose 5%. 1000 milliLiter(s) IV Continuous <Continuous>  dextrose 5%. 1000 milliLiter(s) IV Continuous <Continuous>  influenza  Vaccine (HIGH DOSE) 0.7 milliLiter(s) IntraMuscular once      FAMILY HISTORY:      Non-contributory    SOCIAL HISTORY:    Quit Tobacco 12/24    Allergies    No Known Allergies    Intolerances    	    REVIEW OF SYSTEMS:  CONSTITUTIONAL: No fever  EYES: No eye pain, visual disturbances, or discharge  ENMT:  No difficulty hearing, tinnitus  NECK: No pain or stiffness  RESPIRATORY: No cough, wheezing,  CARDIOVASCULAR: No chest pain, palpitations, passing out, dizziness, or leg swelling  GASTROINTESTINAL:  No nausea, vomiting, diarrhea or constipation. No melena.  GENITOURINARY: No dysuria, hematuria  NEUROLOGICAL: No stroke like symptoms  SKIN: No burning or lesions   ENDOCRINE: No heat or cold intolerance  MUSCULOSKELETAL: No joint pain or swelling  PSYCHIATRIC: No  anxiety, mood swings  HEME/LYMPH: No bleeding gums  ALLERGY AND IMMUNOLOGIC: No hives or eczema	    All other ROS negative    PHYSICAL EXAM:  T(C): 36.4 (03-13-24 @ 17:05), Max: 36.8 (03-12-24 @ 18:55)  HR: 77 (03-13-24 @ 17:05) (66 - 115)  BP: 159/104 (03-13-24 @ 17:05) (137/76 - 159/104)  RR: 18 (03-13-24 @ 17:05) (18 - 18)  SpO2: 97% (03-13-24 @ 17:05) (96% - 99%)  Wt(kg): --  I&O's Summary    13 Mar 2024 07:01  -  13 Mar 2024 18:06  --------------------------------------------------------  IN: 480 mL / OUT: 0 mL / NET: 480 mL        Appearance: Normal	  HEENT:   Normal oral mucosa, EOMI	  Cardiovascular:  S1 S2, No JVD,    Respiratory: Lungs clear to auscultation	  Psychiatry: Alert  Gastrointestinal:  Soft, Non-tender, + BS	  Skin: No rashes   Neurologic: Non-focal  Extremities:  No edema  Vascular: Peripheral pulses palpable    	    	  	  CARDIAC MARKERS:  Labs personally reviewed by me                                  16.8   8.25  )-----------( 278      ( 12 Mar 2024 14:26 )             51.8     03-12    137  |  94<L>  |  17  ----------------------------<  331<H>  4.4   |  30  |  0.90    Ca    10.1      12 Mar 2024 12:31  Phos  3.5     03-12  Mg     1.6     03-12    TPro  8.3  /  Alb  4.3  /  TBili  1.0  /  DBili  x   /  AST  11  /  ALT  26  /  AlkPhos  194<H>  03-12          EKG: Personally reviewed by baljit VENTURA RVR   Radiology: Personally reviewed by me - Clear lungs.      AICD interrogation 3/13/24   1) Indication for interrogation: Routine check prior to MRI (r/o CVA)  2) Presenting rhythm: Afib with occasional V pacing, HR 60-70's  3) Measured data WNL, normal ICD function, Pt is not pacemaker dependent  4) Since 1/30/2020: A pace ~0.2%, V pace 27.3%, AT/AF burden 78.2%  5) Stored data revealed patient has been in longstanding persistent Afib since 12/28/2020. On 3/5/2024 patient was treated with one round of ATP for AF with RVR with average ventricular heart rate at 182 bpm (max  bpm). No ICD shock noted.   6) The patient's ICD model is part of a [employer] advisory in which there is a potential for reduced energy or no energy delivered during high voltage therapy when programmed AX>B configuration. The device therapies have therefore been reprogrammed in the B>AX configuration as advised by [employer].  7) Patient's ICD is MRI compatible. MRI clearance form filled out and placed in pt's physical chart.         Assessment /Plan:   68-year-old male with past medical history of A-fib (on Xarelto) status post defibrillator placement, diabetes (on Ozempic) p/w Confusion, forgetfulness.   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 Patient was sick with fever couple of days ago, confused,     1. Atypical CP  - non exertional CP  - Trop negative x 2   - patient with risk factors: Former smoker, HTN, DM  - check TTE     2. Afib   - [id number] score 4  - check TSH  - c/w home Xarelto   - c/w Atenolol and Verapamil 120mg daily    3. s/p ICD  - ICD compatible with MRI  - recently interrogated IH, unremarkable as above     4. DM  - A1c 11.3  - c/w Insulin as order  - Endo recs appreciated given A1c above 8     5. DVT prophylactic   - c/w Xarelto               Differential diagnosis and plan of care discussed with patient after the evaluation. Counseling on diet, nutritional counseling, weight management, exercise and medication compliance was done.   Advanced care planning/advanced directives discussed with patient/family. DNR status including forceful chest compressions to attempt to restart the heart, ventilator support/artificial breathing, electric shock, artificial nutrition, health care proxy, Molst form all discussed with pt. Pt wishes to consider. More than fifteen minutes spent on discussing advanced directives.     WILLIAN Ferguson, DO [facility]  Cardiovascular Medicine  [address], [address]  Available for call or text via [employer] TEAMs  Office [phone]

## 2024-03-13 NOTE — PROGRESS NOTE ADULT - ASSESSMENT
68-year-old male with past medical history of A-fib (on Xarelto) status post defibrillator placement, diabetes (on Ozempic) p/w Confusion, forgetfulness.       Acute encephalopathy   Dehydration/ viral illness with fevers 2 days ago / r/o CVA   Afib with rvr   CAD     iv fluids   Rate control with Cardiizem and Atenolol   Cards eval appreciated   MRI Brain   AC   Rate control     DM type 2 HISS  A1C     Paln of care discussed with [atients daughter Bed side

## 2024-03-14 ENCOUNTER — RESULT REVIEW (OUTPATIENT)
Age: 69
End: 2024-03-14

## 2024-03-14 LAB
ANION GAP SERPL CALC-SCNC: 14 MMOL/L — SIGNIFICANT CHANGE UP (ref 5–17)
BUN SERPL-MCNC: 15 MG/DL — SIGNIFICANT CHANGE UP (ref 7–23)
CALCIUM SERPL-MCNC: 9.2 MG/DL — SIGNIFICANT CHANGE UP (ref 8.4–10.5)
CHLORIDE SERPL-SCNC: 99 MMOL/L — SIGNIFICANT CHANGE UP (ref 96–108)
CO2 SERPL-SCNC: 26 MMOL/L — SIGNIFICANT CHANGE UP (ref 22–31)
CREAT SERPL-MCNC: 0.72 MG/DL — SIGNIFICANT CHANGE UP (ref 0.5–1.3)
EGFR: 101 ML/MIN/1.73M2 — SIGNIFICANT CHANGE UP
GLUCOSE BLDC GLUCOMTR-MCNC: 201 MG/DL — HIGH (ref 70–99)
GLUCOSE BLDC GLUCOMTR-MCNC: 262 MG/DL — HIGH (ref 70–99)
GLUCOSE BLDC GLUCOMTR-MCNC: 329 MG/DL — HIGH (ref 70–99)
GLUCOSE BLDC GLUCOMTR-MCNC: 377 MG/DL — HIGH (ref 70–99)
GLUCOSE SERPL-MCNC: 242 MG/DL — HIGH (ref 70–99)
HCT VFR BLD CALC: 49 % — SIGNIFICANT CHANGE UP (ref 39–50)
HGB BLD-MCNC: 16 G/DL — SIGNIFICANT CHANGE UP (ref 13–17)
MAGNESIUM SERPL-MCNC: 1.6 MG/DL — SIGNIFICANT CHANGE UP (ref 1.6–2.6)
MCHC RBC-ENTMCNC: 26.7 PG — LOW (ref 27–34)
MCHC RBC-ENTMCNC: 32.7 GM/DL — SIGNIFICANT CHANGE UP (ref 32–36)
MCV RBC AUTO: 81.8 FL — SIGNIFICANT CHANGE UP (ref 80–100)
MRSA PCR RESULT.: DETECTED
NRBC # BLD: 0 /100 WBCS — SIGNIFICANT CHANGE UP (ref 0–0)
PLATELET # BLD AUTO: 328 K/UL — SIGNIFICANT CHANGE UP (ref 150–400)
POTASSIUM SERPL-MCNC: 3.7 MMOL/L — SIGNIFICANT CHANGE UP (ref 3.5–5.3)
POTASSIUM SERPL-SCNC: 3.7 MMOL/L — SIGNIFICANT CHANGE UP (ref 3.5–5.3)
RBC # BLD: 5.99 M/UL — HIGH (ref 4.2–5.8)
RBC # FLD: 14.6 % — HIGH (ref 10.3–14.5)
S AUREUS DNA NOSE QL NAA+PROBE: DETECTED
SODIUM SERPL-SCNC: 139 MMOL/L — SIGNIFICANT CHANGE UP (ref 135–145)
WBC # BLD: 6.67 K/UL — SIGNIFICANT CHANGE UP (ref 3.8–10.5)
WBC # FLD AUTO: 6.67 K/UL — SIGNIFICANT CHANGE UP (ref 3.8–10.5)

## 2024-03-14 PROCEDURE — 93306 TTE W/DOPPLER COMPLETE: CPT | Mod: 26

## 2024-03-14 RX ORDER — CHLORHEXIDINE GLUCONATE 213 G/1000ML
1 SOLUTION TOPICAL
Refills: 0 | Status: DISCONTINUED | OUTPATIENT
Start: 2024-03-14 | End: 2024-03-16

## 2024-03-14 RX ORDER — INSULIN LISPRO 100/ML
5 VIAL (ML) SUBCUTANEOUS
Refills: 0 | Status: DISCONTINUED | OUTPATIENT
Start: 2024-03-15 | End: 2024-03-16

## 2024-03-14 RX ORDER — INSULIN LISPRO 100/ML
5 VIAL (ML) SUBCUTANEOUS
Refills: 0 | Status: DISCONTINUED | OUTPATIENT
Start: 2024-03-14 | End: 2024-03-16

## 2024-03-14 RX ORDER — INSULIN LISPRO 100/ML
VIAL (ML) SUBCUTANEOUS AT BEDTIME
Refills: 0 | Status: DISCONTINUED | OUTPATIENT
Start: 2024-03-14 | End: 2024-03-16

## 2024-03-14 RX ORDER — INSULIN LISPRO 100/ML
VIAL (ML) SUBCUTANEOUS
Refills: 0 | Status: DISCONTINUED | OUTPATIENT
Start: 2024-03-14 | End: 2024-03-16

## 2024-03-14 RX ORDER — INSULIN GLARGINE 100 [IU]/ML
15 INJECTION, SOLUTION SUBCUTANEOUS AT BEDTIME
Refills: 0 | Status: DISCONTINUED | OUTPATIENT
Start: 2024-03-14 | End: 2024-03-16

## 2024-03-14 RX ADMIN — Medication 2: at 21:53

## 2024-03-14 RX ADMIN — CHLORHEXIDINE GLUCONATE 1 APPLICATION(S): 213 SOLUTION TOPICAL at 12:08

## 2024-03-14 RX ADMIN — Medication 5 UNIT(S): at 17:22

## 2024-03-14 RX ADMIN — RIVAROXABAN 20 MILLIGRAM(S): KIT at 17:23

## 2024-03-14 RX ADMIN — Medication 2: at 08:25

## 2024-03-14 RX ADMIN — PANTOPRAZOLE SODIUM 40 MILLIGRAM(S): 20 TABLET, DELAYED RELEASE ORAL at 05:37

## 2024-03-14 RX ADMIN — ATENOLOL 50 MILLIGRAM(S): 25 TABLET ORAL at 05:37

## 2024-03-14 RX ADMIN — Medication 10: at 17:21

## 2024-03-14 RX ADMIN — Medication 120 MILLIGRAM(S): at 06:54

## 2024-03-14 RX ADMIN — INSULIN GLARGINE 15 UNIT(S): 100 INJECTION, SOLUTION SUBCUTANEOUS at 21:53

## 2024-03-14 NOTE — PROGRESS NOTE ADULT - SUBJECTIVE AND OBJECTIVE BOX
DATE OF SERVICE: 03-14-24 @ 10:14    Patient is a 68y old  Male who presents with a chief complaint of AMS x 2 days (13 Mar 2024 23:48)      INTERVAL HISTORY: Feels ok.     REVIEW OF SYSTEMS:  CONSTITUTIONAL: No weakness  EYES/ENT: No visual changes;  No throat pain   NECK: No pain or stiffness  RESPIRATORY: No cough, wheezing; No shortness of breath  CARDIOVASCULAR: No chest pain or palpitations  GASTROINTESTINAL: No abdominal  pain. No nausea, vomiting, or hematemesis  GENITOURINARY: No dysuria, frequency or hematuria  NEUROLOGICAL: No stroke like symptoms  SKIN: No rashes    TELEMETRY Personally reviewed: AF 70-90 PVC, trigeminy  	  MEDICATIONS:  atenolol  Tablet 50 milliGRAM(s) Oral daily  verapamil  milliGRAM(s) Oral daily        PHYSICAL EXAM:  T(C): 36.5 (03-14-24 @ 04:23), Max: 36.7 (03-13-24 @ 11:11)  HR: 79 (03-14-24 @ 05:34) (62 - 79)  BP: 159/94 (03-14-24 @ 04:23) (137/76 - 159/104)  RR: 18 (03-14-24 @ 04:23) (18 - 18)  SpO2: 96% (03-14-24 @ 04:23) (96% - 97%)  Wt(kg): --  I&O's Summary    13 Mar 2024 07:01  -  14 Mar 2024 07:00  --------------------------------------------------------  IN: 480 mL / OUT: 0 mL / NET: 480 mL          Appearance: In no distress	  HEENT:    PERRL, EOMI	  Cardiovascular:  S1 S2, No JVD  Respiratory: Lungs clear to auscultation	  Gastrointestinal:  Soft, Non-tender, + BS	  Vascularature:  No edema of LE  Psychiatric: Appropriate affect   Neuro: no acute focal deficits                               16.0   6.67  )-----------( 328      ( 14 Mar 2024 06:38 )             49.0     03-14    139  |  99  |  15  ----------------------------<  242<H>  3.7   |  26  |  0.72    Ca    9.2      14 Mar 2024 06:38  Phos  3.5     03-12  Mg     1.6     03-14    TPro  8.3  /  Alb  4.3  /  TBili  1.0  /  DBili  x   /  AST  11  /  ALT  26  /  AlkPhos  194<H>  03-12        Labs personally reviewed  < from: TTE with Doppler (w/Cont) (12.04.19 @ 08:04) >  Conclusions:  1. Mitral annular calcification, otherwise normal mitral  valve.  No systolic anterior motion of the mitral valve  leaflet. Minimal mitral regurgitation.  2. Asymmetric septal and apical hypertrophy.  3. Endocardial visualization enhanced with intravenous  injection of Ultrasonic Enhancing Agent (Definity).  Hyperdynamic left ventricular systolic function.  4. Global longitudinal strain = -6.7%.  The strain pattern  demonstraes redued apical and septal strain consistent with  HCM.  Strain imaging was performed on [employer] vivid with an average HR  of 60 BPM and BP of 144/89 mmHg.  5. Mild diastolic dysfunction (Stage I).  6. Normal right ventricular size and function. A device  wire is noted in the right heart.  7. Normal pericardium with no pericardial effusion.  *** Noprevious Echo exam.        ASSESSMENT/PLAN: 	    68-year-old male with past medical history of A-fib (on Xarelto) status post defibrillator placement, diabetes (on Ozempic) p/w Confusion, forgetfulness.   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 Patient was sick with fever couple of days ago, confused,     1. Atypical CP  - non exertional CP  - Trop negative x 2   - patient with risk factors: Former smoker, HTN, DM  - check TTE     2. Afib   - [id number] score 4  - check TSH  - c/w home Xarelto   - c/w Atenolol and Verapamil 120mg daily    3. s/p ICD  - ICD compatible with MRI  - recently interrogated IH, unremarkable as above     4. DM  - A1c 11.3  - c/w Insulin as order  - Endo recs appreciated given A1c above 8     5. DVT prophylactic   - c/w Xarelto               Mariella [place], AG-WILLIAN Mix, DO [facility]  Cardiovascular Medicine  [address] [place] [address], [address]  Available through call or text on [employer]  Office: [phone]

## 2024-03-14 NOTE — CONSULT NOTE ADULT - ASSESSMENT
68y old Male with history of T2DM, Afib here with forgetfulness.     1. T2DM with hyperglycemia  - He has very poor control outpatient and requires insulin on discharge to achieve control. Emphasized importance to his daughter-in-law today with medications and also need for monitoring of his glucose whether it's through FS or CGM.  - I have provided them my card so he can follow up with me   - Start Lantus 15 units at night  - Start Admelog 5 units with meals  - Change to moderate Admelog correctional scale before meals and bedtime  - Monitor FS before meals and bedtime  - Follow hospital hypoglycemic protocol    2. Afib  - on on Verapamil and Xarelto    Discharge recs  1. Start Lantus 15 units at night, he would need prescription for pens and pen needles  2. Stop Ozempic since not tolerating  3. Resume Glimepiride and Brigidt    Vanda Bright MD  [facility]    [address] [place] [id number]    T [phone]  F [phone]

## 2024-03-14 NOTE — PROGRESS NOTE ADULT - ASSESSMENT
68-year-old male with past medical history of A-fib (on Xarelto) status post defibrillator placement, diabetes (on Ozempic) p/w Confusion, forgetfulness.       Acute encephalopathy resolved  Dehydration/ viral illness with fevers 2 days ago / r/o CVA   Afib with rvr   CAD     iv fluids   Rate control with Cardiizem and Atenolol   Cards eval appreciated   MRI Brain   AC   Rate control     DM type 2 HISS  A1C     Paln of care discussed with p[atients daughter Bed side

## 2024-03-14 NOTE — CONSULT NOTE ADULT - SUBJECTIVE AND OBJECTIVE BOX
[facility] Initial Consult Note    HPI  68y old Male with history of T2DM, Afib here with forgetfulness.     History was obtained from patient's daughter-in-law and chart review.    Diabetes history: T2DM for more than 18 years and he's never been on insulin. He has poor control over the years. Ozempic has been causing him to feel unwell so hasn't been compliant with it. Daughter-in-law also reports that he may only be taking his medications once a day. Diet is overall healthy as his wife makes healthy meals.   Home regimen: Ozempic 1 mg weekly (non-compliant due to side effects), Glimepiride 2 mg BID and Janumet  mg BID  Home FS: doesn't really monitor  A1C: 11.3% on 3/13  Outpatient endocrinologist: none, PCP manages    ROS  Denies chest pain, shortness of breath, abdominal pain, nausea, vomiting, diarrhea, dizziness, lightheadedness. Reports good appetite.    Vital Signs Last 24 Hrs  T(C): 36.6 (03-14-24 @ 11:20), Max: 36.7 (03-13-24 @ 21:06)  HR: 85 (03-14-24 @ 16:22) (62 - 85)  BP: 167/92 (03-14-24 @ 16:22) (130/86 - 167/92)  RR: 18 (03-14-24 @ 16:22) (18 - 18)  SpO2: 97% (03-14-24 @ 16:22) (96% - 97%)    Physical Exam  Gen: NAD, alert, awake  HEENT: NC/AT, moist mucous membranes  Chest: normal respiratory effort  Heart: +S1 S2  Neuro: normal mood and affect    Medications  atenolol  Tablet 50 milliGRAM(s) Oral daily  chlorhexidine 2% Cloths 1 Application(s) Topical <User Schedule>  dextrose 5%. 1000 milliLiter(s) IV Continuous <Continuous>  dextrose 5%. 1000 milliLiter(s) IV Continuous <Continuous>  dextrose 50% Injectable 25 Gram(s) IV Push once  dextrose 50% Injectable 25 Gram(s) IV Push once  dextrose 50% Injectable 12.5 Gram(s) IV Push once  dextrose Oral Gel 15 Gram(s) Oral once PRN  glucagon  Injectable 1 milliGRAM(s) IntraMuscular once  influenza  Vaccine (HIGH DOSE) 0.7 milliLiter(s) IntraMuscular once  insulin glargine Injectable (LANTUS) 15 Unit(s) SubCutaneous at bedtime  insulin lispro (ADMELOG) corrective regimen sliding scale   SubCutaneous at bedtime  insulin lispro (ADMELOG) corrective regimen sliding scale   SubCutaneous three times a day before meals  insulin lispro Injectable (ADMELOG) 5 Unit(s) SubCutaneous before dinner  pantoprazole    Tablet 40 milliGRAM(s) Oral before breakfast  rivaroxaban 20 milliGRAM(s) Oral with dinner  verapamil  milliGRAM(s) Oral daily    Pertinent labs/imaging  POCT Blood Glucose.: 377 mg/dL (03-14-24 @ 17:00)  POCT Blood Glucose.: 329 mg/dL (03-14-24 @ 11:43)  POCT Blood Glucose.: 201 mg/dL (03-14-24 @ 07:53)  POCT Blood Glucose.: 280 mg/dL (03-13-24 @ 21:41)    eGFR: 101 mL/min/1.73m2 (03-14-24 @ 06:38)

## 2024-03-14 NOTE — PROGRESS NOTE ADULT - SUBJECTIVE AND OBJECTIVE BOX
Patient is a 68y old  Male who presents with a chief complaint of AMS x 2 days (13 Mar 2024 18:05)      SUBJECTIVE / OVERNIGHT EVENTS:       T(C): 36.4 (03-14-24 @ 20:20), Max: 36.4 (03-14-24 @ 20:20)  HR: 74 (03-14-24 @ 20:20) (74 - 85)  BP: 162/83 (03-14-24 @ 20:20) (162/83 - 167/92)  RR: 18 (03-14-24 @ 20:20) (18 - 18)  SpO2: 96% (03-14-24 @ 20:20) (96% - 97%)    MEDICATIONS  (STANDING):  atenolol  Tablet 50 milliGRAM(s) Oral daily  chlorhexidine 2% Cloths 1 Application(s) Topical <User Schedule>  dextrose 5%. 1000 milliLiter(s) (100 mL/Hr) IV Continuous <Continuous>  dextrose 5%. 1000 milliLiter(s) (50 mL/Hr) IV Continuous <Continuous>  dextrose 50% Injectable 25 Gram(s) IV Push once  dextrose 50% Injectable 25 Gram(s) IV Push once  dextrose 50% Injectable 12.5 Gram(s) IV Push once  glucagon  Injectable 1 milliGRAM(s) IntraMuscular once  influenza  Vaccine (HIGH DOSE) 0.7 milliLiter(s) IntraMuscular once  insulin glargine Injectable (LANTUS) 15 Unit(s) SubCutaneous at bedtime  insulin lispro (ADMELOG) corrective regimen sliding scale   SubCutaneous at bedtime  insulin lispro (ADMELOG) corrective regimen sliding scale   SubCutaneous three times a day before meals  insulin lispro Injectable (ADMELOG) 5 Unit(s) SubCutaneous before dinner  pantoprazole    Tablet 40 milliGRAM(s) Oral before breakfast  rivaroxaban 20 milliGRAM(s) Oral with dinner  verapamil  milliGRAM(s) Oral daily    MEDICATIONS  (PRN):  dextrose Oral Gel 15 Gram(s) Oral once PRN Blood Glucose LESS THAN 70 milliGRAM(s)/deciliter    PHYSICAL EXAM:  GENERAL: NAD, well-developed  HEAD:  Atraumatic, Normocephalic  EYES: EOMI, PERRLA, conjunctiva and sclera clear  NECK: Supple, No JVD  CHEST/LUNG: Clear to auscultation bilaterally; No wheeze  HEART: Regular rate and rhythm; No murmurs, rubs, or gallops  ABDOMEN: Soft, Nontender, Nondistended; Bowel sounds present  EXTREMITIES:  2+ Peripheral Pulses, No clubbing, cyanosis, or edema  PSYCH: AAOx3  NEUROLOGY: non-focal  SKIN: No rashes or lesions                          16.0   6.67  )-----------( 328      ( 14 Mar 2024 06:38 )             49.0               139|99|15<242  3.7|26|0.72  9.2,1.6,--  03-14 @ 06:38

## 2024-03-15 LAB
ANION GAP SERPL CALC-SCNC: 14 MMOL/L — SIGNIFICANT CHANGE UP (ref 5–17)
BUN SERPL-MCNC: 14 MG/DL — SIGNIFICANT CHANGE UP (ref 7–23)
CALCIUM SERPL-MCNC: 9.9 MG/DL — SIGNIFICANT CHANGE UP (ref 8.4–10.5)
CHLORIDE SERPL-SCNC: 96 MMOL/L — SIGNIFICANT CHANGE UP (ref 96–108)
CO2 SERPL-SCNC: 24 MMOL/L — SIGNIFICANT CHANGE UP (ref 22–31)
CREAT SERPL-MCNC: 0.78 MG/DL — SIGNIFICANT CHANGE UP (ref 0.5–1.3)
EGFR: 98 ML/MIN/1.73M2 — SIGNIFICANT CHANGE UP
GLUCOSE BLDC GLUCOMTR-MCNC: 131 MG/DL — HIGH (ref 70–99)
GLUCOSE BLDC GLUCOMTR-MCNC: 270 MG/DL — HIGH (ref 70–99)
GLUCOSE BLDC GLUCOMTR-MCNC: 315 MG/DL — HIGH (ref 70–99)
GLUCOSE BLDC GLUCOMTR-MCNC: 350 MG/DL — HIGH (ref 70–99)
GLUCOSE SERPL-MCNC: 368 MG/DL — HIGH (ref 70–99)
MAGNESIUM SERPL-MCNC: 1.6 MG/DL — SIGNIFICANT CHANGE UP (ref 1.6–2.6)
POTASSIUM SERPL-MCNC: 4.2 MMOL/L — SIGNIFICANT CHANGE UP (ref 3.5–5.3)
POTASSIUM SERPL-SCNC: 4.2 MMOL/L — SIGNIFICANT CHANGE UP (ref 3.5–5.3)
SODIUM SERPL-SCNC: 134 MMOL/L — LOW (ref 135–145)

## 2024-03-15 PROCEDURE — 93287 PERI-PX DEVICE EVAL & PRGR: CPT | Mod: 26,76

## 2024-03-15 PROCEDURE — 70551 MRI BRAIN STEM W/O DYE: CPT | Mod: 26

## 2024-03-15 RX ORDER — MUPIROCIN 20 MG/G
1 OINTMENT TOPICAL
Refills: 0 | Status: DISCONTINUED | OUTPATIENT
Start: 2024-03-15 | End: 2024-03-16

## 2024-03-15 RX ADMIN — MUPIROCIN 1 APPLICATION(S): 20 OINTMENT TOPICAL at 17:12

## 2024-03-15 RX ADMIN — Medication 120 MILLIGRAM(S): at 05:39

## 2024-03-15 RX ADMIN — PANTOPRAZOLE SODIUM 40 MILLIGRAM(S): 20 TABLET, DELAYED RELEASE ORAL at 05:40

## 2024-03-15 RX ADMIN — ATENOLOL 50 MILLIGRAM(S): 25 TABLET ORAL at 05:39

## 2024-03-15 RX ADMIN — CHLORHEXIDINE GLUCONATE 1 APPLICATION(S): 213 SOLUTION TOPICAL at 05:38

## 2024-03-15 RX ADMIN — Medication 8: at 07:56

## 2024-03-15 RX ADMIN — Medication 5 UNIT(S): at 12:25

## 2024-03-15 RX ADMIN — Medication 5 UNIT(S): at 07:56

## 2024-03-15 RX ADMIN — RIVAROXABAN 20 MILLIGRAM(S): KIT at 17:12

## 2024-03-15 RX ADMIN — Medication 2: at 21:57

## 2024-03-15 RX ADMIN — Medication 8: at 17:13

## 2024-03-15 RX ADMIN — Medication 5 UNIT(S): at 17:13

## 2024-03-15 RX ADMIN — INSULIN GLARGINE 15 UNIT(S): 100 INJECTION, SOLUTION SUBCUTANEOUS at 21:57

## 2024-03-15 NOTE — PROGRESS NOTE ADULT - ASSESSMENT
68y old Male with history of T2DM, Afib here with forgetfulness.     1. T2DM with hyperglycemia  - Fasting very high this morning   - Continue Lantus 15 units at night  - Continue Admelog 5 units with meals  - Continue moderate Admelog correctional scale before meals and bedtime  - Monitor FS before meals and bedtime  - Follow hospital hypoglycemic protocol    2. Afib  - on on Verapamil and Xarelto    Will continue to follow.    Vanda Bright MD  [facility]    [address] [place] [address]    T [phone]  F [phone]   68y old Male with history of T2DM, Afib here with forgetfulness.     1. T2DM with hyperglycemia  - Fasting very high this morning however will monitor for now  - Continue Lantus 15 units at night  - Continue Admelog 5 units with meals  - Continue moderate Admelog correctional scale before meals and bedtime  - Monitor FS before meals and bedtime  - Follow hospital hypoglycemic protocol    2. Afib  - on on Verapamil and Xarelto    Discharge recs  1. Start Lantus 15 units at night, he would need prescription for pens and pen needles  2. Stop Ozempic since not tolerating  3. Resume Glimepiride and Janumet    Vanda Bright MD  [facility]    [address] [place] [address]    T [phone]  F [phone]

## 2024-03-15 NOTE — PROCEDURE NOTE - INTERROGATION NOTE: COMMENTS
Battery longevity estimated at 2.2 years
Battery longevity estimated at 2.2 years
Battery longevity: 2.2 years remaining

## 2024-03-15 NOTE — PROGRESS NOTE ADULT - SUBJECTIVE AND OBJECTIVE BOX
Patient is a 68y old  Male who presents with a chief complaint of AMS x 2 days (13 Mar 2024 18:05)      SUBJECTIVE / OVERNIGHT EVENTS:       T(C): 36.9 (03-15-24 @ 21:37), Max: 36.9 (03-15-24 @ 21:37)  HR: 83 (03-15-24 @ 21:37) (65 - 83)  BP: 149/90 (03-15-24 @ 21:37) (134/93 - 163/89)  RR: 18 (03-15-24 @ 21:37) (18 - 18)  SpO2: 97% (03-15-24 @ 21:37) (96% - 97%)      MEDICATIONS  (STANDING):  atenolol  Tablet 50 milliGRAM(s) Oral daily  chlorhexidine 2% Cloths 1 Application(s) Topical <User Schedule>  dextrose 5%. 1000 milliLiter(s) (50 mL/Hr) IV Continuous <Continuous>  dextrose 5%. 1000 milliLiter(s) (100 mL/Hr) IV Continuous <Continuous>  dextrose 50% Injectable 25 Gram(s) IV Push once  dextrose 50% Injectable 25 Gram(s) IV Push once  dextrose 50% Injectable 12.5 Gram(s) IV Push once  glucagon  Injectable 1 milliGRAM(s) IntraMuscular once  influenza  Vaccine (HIGH DOSE) 0.7 milliLiter(s) IntraMuscular once  insulin glargine Injectable (LANTUS) 15 Unit(s) SubCutaneous at bedtime  insulin lispro (ADMELOG) corrective regimen sliding scale   SubCutaneous at bedtime  insulin lispro (ADMELOG) corrective regimen sliding scale   SubCutaneous three times a day before meals  insulin lispro Injectable (ADMELOG) 5 Unit(s) SubCutaneous before breakfast  insulin lispro Injectable (ADMELOG) 5 Unit(s) SubCutaneous before lunch  insulin lispro Injectable (ADMELOG) 5 Unit(s) SubCutaneous before dinner  mupirocin 2% Nasal 1 Application(s) Both Nostrils two times a day  pantoprazole    Tablet 40 milliGRAM(s) Oral before breakfast  rivaroxaban 20 milliGRAM(s) Oral with dinner  verapamil  milliGRAM(s) Oral daily    MEDICATIONS  (PRN):  dextrose Oral Gel 15 Gram(s) Oral once PRN Blood Glucose LESS THAN 70 milliGRAM(s)/deciliter              4PHYSICAL EXAM:  GENERAL: NAD, well-developed  HEAD:  Atraumatic, Normocephalic  EYES: EOMI, PERRLA, conjunctiva and sclera clear  NECK: Supple, No JVD  CHEST/LUNG: Clear to auscultation bilaterally; No wheeze  HEART: Regular rate and rhythm; No murmurs, rubs, or gallops  ABDOMEN: Soft, Nontender, Nondistended; Bowel sounds present  EXTREMITIES:  2+ Peripheral Pulses, No clubbing, cyanosis, or edema  PSYCH: AAOx3  NEUROLOGY: non-focal  SKIN: No rashes or lesions                                                16.0   6.67  )-----------( 328      ( 14 Mar 2024 06:38 )             49.0               134|96|14<368  4.2|24|0.78  9.9,1.6,--  03-15 @ 07:13

## 2024-03-15 NOTE — PROGRESS NOTE ADULT - ASSESSMENT
68-year-old male with past medical history of A-fib (on Xarelto) status post defibrillator placement, diabetes (on Ozempic) p/w Confusion, forgetfulness.       Acute encephalopathy resolved  Dehydration/ viral illness with fevers 2 days ago / r/o CVA   Afib with rvr   CAD     HOCM   Rate control with Cardiizem and Atenolol   Cards eval appreciated   MRI Brain shows acute CVA   AC   Rate control     DM type 2 HISS  A1C   Uncontrolled   A1C 11 Insulin as per Endo    Paln of care discussed with p[atients daughter Bed side

## 2024-03-15 NOTE — PROGRESS NOTE ADULT - SUBJECTIVE AND OBJECTIVE BOX
[facility] Progress Note    MAR, chart notes, fingersticks and labs reviewed    Subjective:    Objective  Vital Signs  T(C): 36.7 (03-15-24 @ 12:16), Max: 36.7 (03-15-24 @ 12:16)  HR: 65 (03-15-24 @ 12:16) (63 - 85)  BP: 134/93 (03-15-24 @ 12:16) (134/93 - 167/92)  RR: 18 (03-15-24 @ 12:16) (18 - 18)  SpO2: 96% (03-15-24 @ 12:16) (94% - 97%)    Physical Exam  Gen: NAD, alert, awake  HEENT: NC/AT, EOMI  Neck: supple  Chest: breathing comfortably  Heart: +s1 +s2    Medications  atenolol  Tablet 50 milliGRAM(s) Oral daily  chlorhexidine 2% Cloths 1 Application(s) Topical <User Schedule>  dextrose 5%. 1000 milliLiter(s) IV Continuous <Continuous>  dextrose 5%. 1000 milliLiter(s) IV Continuous <Continuous>  dextrose 50% Injectable 12.5 Gram(s) IV Push once  dextrose 50% Injectable 25 Gram(s) IV Push once  dextrose 50% Injectable 25 Gram(s) IV Push once  dextrose Oral Gel 15 Gram(s) Oral once PRN  glucagon  Injectable 1 milliGRAM(s) IntraMuscular once  influenza  Vaccine (HIGH DOSE) 0.7 milliLiter(s) IntraMuscular once  insulin glargine Injectable (LANTUS) 15 Unit(s) SubCutaneous at bedtime  insulin lispro (ADMELOG) corrective regimen sliding scale   SubCutaneous three times a day before meals  insulin lispro (ADMELOG) corrective regimen sliding scale   SubCutaneous at bedtime  insulin lispro Injectable (ADMELOG) 5 Unit(s) SubCutaneous before dinner  insulin lispro Injectable (ADMELOG) 5 Unit(s) SubCutaneous before breakfast  insulin lispro Injectable (ADMELOG) 5 Unit(s) SubCutaneous before lunch  mupirocin 2% Nasal 1 Application(s) Both Nostrils two times a day  pantoprazole    Tablet 40 milliGRAM(s) Oral before breakfast  rivaroxaban 20 milliGRAM(s) Oral with dinner  verapamil  milliGRAM(s) Oral daily    Pertinent labs/Imaging  POCT Blood Glucose.: 131 mg/dL (03-15-24 @ 12:20)  POCT Blood Glucose.: 315 mg/dL (03-15-24 @ 07:40)  POCT Blood Glucose.: 262 mg/dL (03-14-24 @ 21:19)  POCT Blood Glucose.: 377 mg/dL (03-14-24 @ 17:00)    eGFR: 98 mL/min/1.73m2 (03-15-24 @ 07:13)  eGFR: 101 mL/min/1.73m2 (03-14-24 @ 06:38)       [facility] Progress Note    MAR, chart notes, fingersticks and labs reviewed    Subjective: spoke with daughter-in-law, unclear about discharge today, denies patient snacking overnight    Objective  Vital Signs  T(C): 36.7 (03-15-24 @ 12:16), Max: 36.7 (03-15-24 @ 12:16)  HR: 65 (03-15-24 @ 12:16) (63 - 85)  BP: 134/93 (03-15-24 @ 12:16) (134/93 - 167/92)  RR: 18 (03-15-24 @ 12:16) (18 - 18)  SpO2: 96% (03-15-24 @ 12:16) (94% - 97%)    Physical Exam  Gen: NAD, alert, awake  HEENT: NC/AT, EOMI  Neck: supple  Chest: breathing comfortably  Heart: +s1 +s2    Medications  atenolol  Tablet 50 milliGRAM(s) Oral daily  chlorhexidine 2% Cloths 1 Application(s) Topical <User Schedule>  dextrose 5%. 1000 milliLiter(s) IV Continuous <Continuous>  dextrose 5%. 1000 milliLiter(s) IV Continuous <Continuous>  dextrose 50% Injectable 12.5 Gram(s) IV Push once  dextrose 50% Injectable 25 Gram(s) IV Push once  dextrose 50% Injectable 25 Gram(s) IV Push once  dextrose Oral Gel 15 Gram(s) Oral once PRN  glucagon  Injectable 1 milliGRAM(s) IntraMuscular once  influenza  Vaccine (HIGH DOSE) 0.7 milliLiter(s) IntraMuscular once  insulin glargine Injectable (LANTUS) 15 Unit(s) SubCutaneous at bedtime  insulin lispro (ADMELOG) corrective regimen sliding scale   SubCutaneous three times a day before meals  insulin lispro (ADMELOG) corrective regimen sliding scale   SubCutaneous at bedtime  insulin lispro Injectable (ADMELOG) 5 Unit(s) SubCutaneous before dinner  insulin lispro Injectable (ADMELOG) 5 Unit(s) SubCutaneous before breakfast  insulin lispro Injectable (ADMELOG) 5 Unit(s) SubCutaneous before lunch  mupirocin 2% Nasal 1 Application(s) Both Nostrils two times a day  pantoprazole    Tablet 40 milliGRAM(s) Oral before breakfast  rivaroxaban 20 milliGRAM(s) Oral with dinner  verapamil  milliGRAM(s) Oral daily    Pertinent labs/Imaging  POCT Blood Glucose.: 131 mg/dL (03-15-24 @ 12:20)  POCT Blood Glucose.: 315 mg/dL (03-15-24 @ 07:40)  POCT Blood Glucose.: 262 mg/dL (03-14-24 @ 21:19)  POCT Blood Glucose.: 377 mg/dL (03-14-24 @ 17:00)    eGFR: 98 mL/min/1.73m2 (03-15-24 @ 07:13)  eGFR: 101 mL/min/1.73m2 (03-14-24 @ 06:38)

## 2024-03-15 NOTE — PROCEDURE NOTE - ADDITIONAL PROCEDURE DETAILS
Indication: Post MRI reprogramming  - normal sensing and RV pacing threshold  - stable lead impedances  - Changes Made: MRI sure scan mode turned off.  Patient returned to pre MRI settings

## 2024-03-15 NOTE — PROGRESS NOTE ADULT - SUBJECTIVE AND OBJECTIVE BOX
DATE OF SERVICE: 03-15-24 @ 16:18    Patient is a 68y old  Male who presents with a chief complaint of AMS x 2 days (15 Mar 2024 13:20)      INTERVAL HISTORY: Feels ok. Daughter Min at bedside.     REVIEW OF SYSTEMS:  CONSTITUTIONAL: No weakness  EYES/ENT: No visual changes;  No throat pain   NECK: No pain or stiffness  RESPIRATORY: No cough, wheezing; No shortness of breath  CARDIOVASCULAR: No chest pain or palpitations  GASTROINTESTINAL: No abdominal  pain. No nausea, vomiting, or hematemesis  GENITOURINARY: No dysuria, frequency or hematuria  NEUROLOGICAL: No stroke like symptoms  SKIN: No rashes    TELEMETRY Personally reviewed: V-Paced in 60s  	  MEDICATIONS:  atenolol  Tablet 50 milliGRAM(s) Oral daily  verapamil  milliGRAM(s) Oral daily        PHYSICAL EXAM:  T(C): 36.7 (03-15-24 @ 12:16), Max: 36.7 (03-15-24 @ 12:16)  HR: 65 (03-15-24 @ 12:16) (63 - 85)  BP: 134/93 (03-15-24 @ 12:16) (134/93 - 167/92)  RR: 18 (03-15-24 @ 12:16) (18 - 18)  SpO2: 96% (03-15-24 @ 12:16) (94% - 97%)  Wt(kg): --  I&O's Summary    14 Mar 2024 07:01  -  15 Mar 2024 07:00  --------------------------------------------------------  IN: 640 mL / OUT: 4 mL / NET: 636 mL    15 Mar 2024 07:01  -  15 Mar 2024 16:18  --------------------------------------------------------  IN: 500 mL / OUT: 3 mL / NET: 497 mL          Appearance: In no distress	  HEENT:    PERRL, EOMI	  Cardiovascular:  S1 S2, No JVD  Respiratory: Lungs clear to auscultation	  Gastrointestinal:  Soft, Non-tender, + BS	  Vascularature:  No edema of LE  Psychiatric: Appropriate affect   Neuro: no acute focal deficits                               16.0   6.67  )-----------( 328      ( 14 Mar 2024 06:38 )             49.0     03-15    134<L>  |  96  |  14  ----------------------------<  368<H>  4.2   |  24  |  0.78    Ca    9.9      15 Mar 2024 07:13  Mg     1.6     03-15          Labs personally reviewed  < from: TTE W or WO Ultrasound Enhancing Agent (03.14.24 @ 10:43) >  CONCLUSIONS:      1. Non-obstructive hypertrophic cardiomyopathy.   2. Left ventricular cavity is normal in size. Left ventricular systolic function is normal with an ejection fraction of 58 % by Ureña's method of disks. There are no regional wall motion abnormalities seen.   3. Severe left ventricular hypertrophy.   4. Device lead is visualized in the right heart.    < end of copied text >      ASSESSMENT/PLAN: 	    68-year-old male with past medical history of A-fib (on Xarelto) status post defibrillator placement, diabetes (on Ozempic) p/w Confusion, forgetfulness.   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 Patient was sick with fever couple of days ago, confused,     1. Atypical CP  - non exertional CP  - Trop negative x 2   - patient with risk factors: Former smoker, HTN, DM  - TTE shows non-obstructive HCM  - Chest pain now resolved     2. Afib   - [id number] score 4  - check TSH  - c/w home Xarelto   - c/w Atenolol and Verapamil 120mg daily    3. s/p ICD  - ICD compatible with MRI  - recently interrogated IH, unremarkable as above     4. DM  - A1c 11.3  - c/w Insulin as order  - Endo recs appreciated given A1c above 8     5. DVT prophylactic   - c/w Xarelto         Mariella Kapoor, AG-NP   Girma Mix DO [facility]  Cardiovascular Medicine  [address], [address]  Available through call or text on [employer]  Office: [phone]

## 2024-03-15 NOTE — PROCEDURE NOTE - ADDITIONAL PROCEDURE DETAILS
Indication: Pre MRI reprogramming  - normal sensing and RV pacing threshold  - stable lead impedances  - Changes Made: Patient placed in MRI sure scan, mode changed to VOO  - Call post MRI to reprogram

## 2024-03-16 ENCOUNTER — TRANSCRIPTION ENCOUNTER (OUTPATIENT)
Age: 69
End: 2024-03-16

## 2024-03-16 VITALS
OXYGEN SATURATION: 97 % | HEART RATE: 63 BPM | SYSTOLIC BLOOD PRESSURE: 153 MMHG | DIASTOLIC BLOOD PRESSURE: 85 MMHG | RESPIRATION RATE: 18 BRPM

## 2024-03-16 LAB
ANION GAP SERPL CALC-SCNC: 11 MMOL/L — SIGNIFICANT CHANGE UP (ref 5–17)
BUN SERPL-MCNC: 14 MG/DL — SIGNIFICANT CHANGE UP (ref 7–23)
CALCIUM SERPL-MCNC: 9.8 MG/DL — SIGNIFICANT CHANGE UP (ref 8.4–10.5)
CHLORIDE SERPL-SCNC: 99 MMOL/L — SIGNIFICANT CHANGE UP (ref 96–108)
CO2 SERPL-SCNC: 27 MMOL/L — SIGNIFICANT CHANGE UP (ref 22–31)
CREAT SERPL-MCNC: 0.77 MG/DL — SIGNIFICANT CHANGE UP (ref 0.5–1.3)
EGFR: 99 ML/MIN/1.73M2 — SIGNIFICANT CHANGE UP
GLUCOSE BLDC GLUCOMTR-MCNC: 118 MG/DL — HIGH (ref 70–99)
GLUCOSE BLDC GLUCOMTR-MCNC: 193 MG/DL — HIGH (ref 70–99)
GLUCOSE BLDC GLUCOMTR-MCNC: 236 MG/DL — HIGH (ref 70–99)
GLUCOSE SERPL-MCNC: 252 MG/DL — HIGH (ref 70–99)
MAGNESIUM SERPL-MCNC: 1.7 MG/DL — SIGNIFICANT CHANGE UP (ref 1.6–2.6)
PHOSPHATE SERPL-MCNC: 4.4 MG/DL — SIGNIFICANT CHANGE UP (ref 2.5–4.5)
POTASSIUM SERPL-MCNC: 3.8 MMOL/L — SIGNIFICANT CHANGE UP (ref 3.5–5.3)
POTASSIUM SERPL-SCNC: 3.8 MMOL/L — SIGNIFICANT CHANGE UP (ref 3.5–5.3)
SODIUM SERPL-SCNC: 137 MMOL/L — SIGNIFICANT CHANGE UP (ref 135–145)

## 2024-03-16 PROCEDURE — 84484 ASSAY OF TROPONIN QUANT: CPT

## 2024-03-16 PROCEDURE — 83605 ASSAY OF LACTIC ACID: CPT

## 2024-03-16 PROCEDURE — 99285 EMERGENCY DEPT VISIT HI MDM: CPT | Mod: 25

## 2024-03-16 PROCEDURE — 82435 ASSAY OF BLOOD CHLORIDE: CPT

## 2024-03-16 PROCEDURE — C8929: CPT

## 2024-03-16 PROCEDURE — 81001 URINALYSIS AUTO W/SCOPE: CPT

## 2024-03-16 PROCEDURE — 87641 MR-STAPH DNA AMP PROBE: CPT

## 2024-03-16 PROCEDURE — 83735 ASSAY OF MAGNESIUM: CPT

## 2024-03-16 PROCEDURE — 82947 ASSAY GLUCOSE BLOOD QUANT: CPT

## 2024-03-16 PROCEDURE — 85018 HEMOGLOBIN: CPT

## 2024-03-16 PROCEDURE — 82330 ASSAY OF CALCIUM: CPT

## 2024-03-16 PROCEDURE — 84132 ASSAY OF SERUM POTASSIUM: CPT

## 2024-03-16 PROCEDURE — 87637 SARSCOV2&INF A&B&RSV AMP PRB: CPT

## 2024-03-16 PROCEDURE — 85025 COMPLETE CBC W/AUTO DIFF WBC: CPT

## 2024-03-16 PROCEDURE — 83036 HEMOGLOBIN GLYCOSYLATED A1C: CPT

## 2024-03-16 PROCEDURE — 96374 THER/PROPH/DIAG INJ IV PUSH: CPT

## 2024-03-16 PROCEDURE — 85027 COMPLETE CBC AUTOMATED: CPT

## 2024-03-16 PROCEDURE — 80048 BASIC METABOLIC PNL TOTAL CA: CPT

## 2024-03-16 PROCEDURE — 82803 BLOOD GASES ANY COMBINATION: CPT

## 2024-03-16 PROCEDURE — 85014 HEMATOCRIT: CPT

## 2024-03-16 PROCEDURE — 70450 CT HEAD/BRAIN W/O DYE: CPT | Mod: MC

## 2024-03-16 PROCEDURE — 84295 ASSAY OF SERUM SODIUM: CPT

## 2024-03-16 PROCEDURE — 70551 MRI BRAIN STEM W/O DYE: CPT | Mod: MC

## 2024-03-16 PROCEDURE — 82962 GLUCOSE BLOOD TEST: CPT

## 2024-03-16 PROCEDURE — 80053 COMPREHEN METABOLIC PANEL: CPT

## 2024-03-16 PROCEDURE — 71046 X-RAY EXAM CHEST 2 VIEWS: CPT

## 2024-03-16 PROCEDURE — 71045 X-RAY EXAM CHEST 1 VIEW: CPT

## 2024-03-16 PROCEDURE — 84443 ASSAY THYROID STIM HORMONE: CPT

## 2024-03-16 PROCEDURE — 82010 KETONE BODYS QUAN: CPT

## 2024-03-16 PROCEDURE — 87640 STAPH A DNA AMP PROBE: CPT

## 2024-03-16 PROCEDURE — 84100 ASSAY OF PHOSPHORUS: CPT

## 2024-03-16 PROCEDURE — 87086 URINE CULTURE/COLONY COUNT: CPT

## 2024-03-16 RX ORDER — INSULIN GLARGINE 100 [IU]/ML
18 INJECTION, SOLUTION SUBCUTANEOUS
Qty: 1 | Refills: 0
Start: 2024-03-16

## 2024-03-16 RX ORDER — POTASSIUM CHLORIDE 20 MEQ
40 PACKET (EA) ORAL ONCE
Refills: 0 | Status: COMPLETED | OUTPATIENT
Start: 2024-03-16 | End: 2024-03-16

## 2024-03-16 RX ORDER — ATENOLOL 25 MG/1
1 TABLET ORAL
Qty: 0 | Refills: 0 | DISCHARGE
Start: 2024-03-16

## 2024-03-16 RX ORDER — IRBESARTAN 75 MG/1
1 TABLET ORAL
Qty: 0 | Refills: 0 | DISCHARGE

## 2024-03-16 RX ORDER — MAGNESIUM SULFATE 500 MG/ML
1 VIAL (ML) INJECTION ONCE
Refills: 0 | Status: COMPLETED | OUTPATIENT
Start: 2024-03-16 | End: 2024-03-16

## 2024-03-16 RX ORDER — LEVOCETIRIZINE DIHYDROCHLORIDE 0.5 MG/ML
1 SOLUTION ORAL
Refills: 0 | DISCHARGE

## 2024-03-16 RX ORDER — MUPIROCIN 20 MG/G
1 OINTMENT TOPICAL
Qty: 1 | Refills: 0
Start: 2024-03-16 | End: 2024-03-19

## 2024-03-16 RX ORDER — ATENOLOL 25 MG/1
1 TABLET ORAL
Refills: 0 | DISCHARGE

## 2024-03-16 RX ORDER — INSULIN GLARGINE 100 [IU]/ML
18 INJECTION, SOLUTION SUBCUTANEOUS AT BEDTIME
Refills: 0 | Status: DISCONTINUED | OUTPATIENT
Start: 2024-03-16 | End: 2024-03-16

## 2024-03-16 RX ORDER — SEMAGLUTIDE 0.68 MG/ML
1 INJECTION, SOLUTION SUBCUTANEOUS
Refills: 0 | DISCHARGE

## 2024-03-16 RX ORDER — INSULIN LISPRO 100/ML
8 VIAL (ML) SUBCUTANEOUS
Refills: 0 | Status: DISCONTINUED | OUTPATIENT
Start: 2024-03-16 | End: 2024-03-16

## 2024-03-16 RX ADMIN — ATENOLOL 50 MILLIGRAM(S): 25 TABLET ORAL at 06:27

## 2024-03-16 RX ADMIN — CHLORHEXIDINE GLUCONATE 1 APPLICATION(S): 213 SOLUTION TOPICAL at 05:22

## 2024-03-16 RX ADMIN — Medication 5 UNIT(S): at 17:17

## 2024-03-16 RX ADMIN — MUPIROCIN 1 APPLICATION(S): 20 OINTMENT TOPICAL at 17:17

## 2024-03-16 RX ADMIN — Medication 2: at 11:34

## 2024-03-16 RX ADMIN — MUPIROCIN 1 APPLICATION(S): 20 OINTMENT TOPICAL at 06:27

## 2024-03-16 RX ADMIN — Medication 4: at 07:42

## 2024-03-16 RX ADMIN — PANTOPRAZOLE SODIUM 40 MILLIGRAM(S): 20 TABLET, DELAYED RELEASE ORAL at 06:27

## 2024-03-16 RX ADMIN — Medication 120 MILLIGRAM(S): at 06:27

## 2024-03-16 RX ADMIN — Medication 100 GRAM(S): at 11:11

## 2024-03-16 RX ADMIN — Medication 8 UNIT(S): at 11:35

## 2024-03-16 RX ADMIN — Medication 5 UNIT(S): at 07:42

## 2024-03-16 RX ADMIN — RIVAROXABAN 20 MILLIGRAM(S): KIT at 17:17

## 2024-03-16 RX ADMIN — Medication 40 MILLIEQUIVALENT(S): at 11:11

## 2024-03-16 NOTE — PROVIDER CONTACT NOTE (OTHER) - ASSESSMENT
aox3 denies cp, sob , palpitations or dizziness. states he just feels very tired and sleepy. /81, o2 97%. T 98.5, paced 60-70s

## 2024-03-16 NOTE — DISCHARGE NOTE PROVIDER - NSDCCPCAREPLAN_GEN_ALL_CORE_FT
PRINCIPAL DISCHARGE DIAGNOSIS  Diagnosis: AMS (altered mental status)  Assessment and Plan of Treatment:   CT Head and MRI Head negative.   Follow up with your PCP within 1 week of discharge.      SECONDARY DISCHARGE DIAGNOSES  Diagnosis: Diabetes mellitus with hyperglycemia  Assessment and Plan of Treatment: HgA1C this admission -11.3  Make sure you get your HgA1c checked every three months.  If you take oral diabetes medications, check your blood glucose two times a day.  If you take insulin, check your blood glucose before meals and at bedtime.  It's important not to skip any meals.  Keep a log of your blood glucose results and always take it with you to your doctor appointments.  Keep a list of your current medications including injectables and over the counter medications and bring this medication list with you to all your doctor appointments.  If you have not seen your ophthalmologist this year call for appointment.  Check your feet daily for redness, sores, or openings. Do not self treat. If no improvement in two days call your primary care physician for an appointment.  Low blood sugar (hypoglycemia) is a blood sugar below 70mg/dl. Check your blood sugar if you feel signs/symptoms of hypoglycemia. If your blood sugar is below 70 take 15 grams of carbohydrates (ex 4 oz of apple juice, 3-4 glucose tablets, or 4-6 oz of regular soda) wait 15 minutes and repeat blood sugar to make sure it comes up above 70.  If your blood sugar is above 70 and you are due for a meal, have a meal.  If you are not due for a meal have a snack.  This snack helps keeps your blood sugar at a safe range.  -Recommended to start Lantus 18 units at night (needs prescription for pens and pen needles), stop Ozempic since not tolerating, resume Glimepiride and Janumet.  -Please follow up with endocrinologist after discharge.    Diagnosis: Atrial fibrillation  Assessment and Plan of Treatment: Continue on Xarelto, atenolol and verapamil.  Please follow up with cardiologist Dr Mix after discharge.    Diagnosis: Atypical chest pain  Assessment and Plan of Treatment: ICD interrogated showing atrial fibrillation with VHR 60-70's.   TTE findings:   1. Non-obstructive hypertrophic cardiomyopathy.   2. Left ventricular cavity is normal in size. Left ventricular systolic function is normal with an ejection fraction of 58 % by Ureña's method of disks. There are no regional wall motion abnormalities seen.   3. Severe left ventricular hypertrophy.   4. Device lead is visualized in the right heart.  Please follow up with cardiologist Dr Mix after discharge.

## 2024-03-16 NOTE — DISCHARGE NOTE PROVIDER - PROVIDER TOKENS
PROVIDER:[TOKEN:[[id number]],FOLLOWUP:[1 week]],PROVIDER:[TOKEN:[[id number]],FOLLOWUP:[1-3 days]],PROVIDER:[TOKEN:[[id number]],FOLLOWUP:[1 week]]

## 2024-03-16 NOTE — PROGRESS NOTE ADULT - ASSESSMENT
68y old Male with history of T2DM, Afib here with forgetfulness.     1. T2DM with hyperglycemia    - Continue Lantus 15 units at night  - Continue Admelog 5 units with breakfast, increase to 8 units with lunch and continue 5 units with dinner  - Continue moderate Admelog correctional scale before meals and bedtime  - Monitor FS before meals and bedtime  - Follow hospital hypoglycemic protocol    2. Afib  - on on Verapamil and Xarelto    Discharge recs  1. Start Lantus 15 units at night, he would need prescription for pens and pen needles  2. Stop Ozempic since not tolerating  3. Resume Glimepiride and Uri Bright MD  [facility] of Endocrin[facility]    [address] [place] [address]    T [phone]  F [phone]   68y old Male with history of T2DM, Afib here with forgetfulness.     1. T2DM with hyperglycemia  - fasting high so will increase Lantus, also very high after lunch yesterday  - Increase Lantus to 18 units at night  - Continue Admelog 5 units with breakfast, increase to 8 units with lunch and continue 5 units with dinner  - Continue moderate Admelog correctional scale before meals and bedtime  - Monitor FS before meals and bedtime  - Follow hospital hypoglycemic protocol    2. Afib  - on on Verapamil and Xarelto    Discharge recs  1. Start Lantus 18 units at night, he would need prescription for pens and pen needles  2. Stop Ozempic since not tolerating  3. Resume Glimepiride and Janumet    Vanda Bright MD  [facility]    [address] [place] [address]    T [phone]  F [phone]

## 2024-03-16 NOTE — DISCHARGE NOTE NURSING/CASE MANAGEMENT/SOCIAL WORK - NSDCPEFALRISK_GEN_ALL_CORE
For information on Fall & Injury Prevention, visit: [email or web address] OR  [email or web address] OR  https://www.cdc.gov/steadi/patient.html

## 2024-03-16 NOTE — DISCHARGE NOTE PROVIDER - NSDCFUADDAPPT_GEN_ALL_CORE_FT
APPTS ARE READY TO BE MADE: [x] YES    Best Family or Patient Contact (if needed):    Additional Information about above appointments (if needed):    1:   2:   3:     Other comments or requests:    APPTS ARE READY TO BE MADE: [x] YES    Best Family or Patient Contact (if needed):    Additional Information about above appointments (if needed):    1:   2:   3:     Other comments or requests:   Patient's son was provided with referral details by email for a [facility] provider and will coordinate the appointment on their own. Son advised he will assist in scheduling and a message was also left for patient directly to offer assistance.

## 2024-03-16 NOTE — DISCHARGE NOTE PROVIDER - NSDCMRMEDTOKEN_GEN_ALL_CORE_FT
atenolol 50 mg oral tablet: 1 tab(s) orally 2 times a day  glimepiride 2 mg oral tablet: 1 tab(s) orally 2 times a day  irbesartan 300 mg oral tablet: 1 tab(s) orally once a day  Janumet 50 mg-1000 mg oral tablet: 1 tab(s) orally 2 times a day  lansoprazole 30 mg oral delayed release capsule: 1 cap(s) orally once a day  levocetirizine 5 mg oral tablet: 1 tab(s) orally once a day  magnesium oxide 400 mg oral tablet: 1 tab(s) orally once a day  pravastatin 40 mg oral tablet: 1 tab(s) orally once a day  semaglutide 1 mg/0.5 mL (1 mg dose) subcutaneous solution: 1 milligram(s) subcutaneously once a week  verapamil 120 mg/24 hours oral capsule, extended release: 1 cap(s) orally once a day  Xarelto 20 mg oral tablet: 1 tab(s) orally once a day (in the evening)   atenolol 50 mg oral tablet: 1 tab(s) orally once a day  glimepiride 2 mg oral tablet: 1 tab(s) orally 2 times a day  Insulin Pen Needles, 4mm: 1 application subcutaneously 4 times a day. ** Use with insulin pen **  Janumet 50 mg-1000 mg oral tablet: 1 tab(s) orally 2 times a day  lancets: 1 application subcutaneously 4 times a day  lansoprazole 30 mg oral delayed release capsule: 1 cap(s) orally once a day  Lantus Solostar Pen 100 units/mL subcutaneous solution: 18 unit(s) subcutaneous once a day (at bedtime) unit(s) subcutaneous once a day  magnesium oxide 400 mg oral tablet: 1 tab(s) orally once a day  mupirocin 2% topical ointment: Apply topically to affected area 2 times a day Please apply to both nares for MRSA  pravastatin 40 mg oral tablet: 1 tab(s) orally once a day  verapamil 120 mg/24 hours oral capsule, extended release: 1 cap(s) orally once a day  Xarelto 20 mg oral tablet: 1 tab(s) orally once a day (in the evening)   atenolol 50 mg oral tablet: 1 tab(s) orally once a day  Freestyle Tori 3 Sensors: Please change every 14 days  glimepiride 2 mg oral tablet: 1 tab(s) orally 2 times a day  Insulin Pen Needles, 4mm: 1 application subcutaneously 4 times a day. ** Use with insulin pen **  Janumet 50 mg-1000 mg oral tablet: 1 tab(s) orally 2 times a day  lancets: 1 application subcutaneously 4 times a day  lansoprazole 30 mg oral delayed release capsule: 1 cap(s) orally once a day  Lantus Solostar Pen 100 units/mL subcutaneous solution: 18 unit(s) subcutaneous once a day (at bedtime) unit(s) subcutaneous once a day  magnesium oxide 400 mg oral tablet: 1 tab(s) orally once a day  mupirocin 2% topical ointment: Apply topically to affected area 2 times a day Please apply to both nares for MRSA  pravastatin 40 mg oral tablet: 1 tab(s) orally once a day  verapamil 120 mg/24 hours oral capsule, extended release: 1 cap(s) orally once a day  Xarelto 20 mg oral tablet: 1 tab(s) orally once a day (in the evening)

## 2024-03-16 NOTE — DISCHARGE NOTE NURSING/CASE MANAGEMENT/SOCIAL WORK - PATIENT PORTAL LINK FT
You can access the FollowMyHealth Patient Portal offered by [facility] by registering at the following website: [email or web address]. By joining [facility]’s FollowMyHealth portal, you will also be able to view your health information using other applications (apps) compatible with our system.

## 2024-03-16 NOTE — PROGRESS NOTE ADULT - SUBJECTIVE AND OBJECTIVE BOX
DATE OF SERVICE: 03-16-24 @ 15:37    Patient is a 68y old  Male who presents with a chief complaint of AMS x 2 days (16 Mar 2024 05:55)      INTERVAL HISTORY: feels okay    REVIEW OF SYSTEMS:  CONSTITUTIONAL: No weakness  EYES/ENT: No visual changes;  No throat pain   NECK: No pain or stiffness  RESPIRATORY: No cough, wheezing; No shortness of breath  CARDIOVASCULAR: No chest pain or palpitations  GASTROINTESTINAL: No abdominal  pain. No nausea, vomiting, or hematemesis  GENITOURINARY: No dysuria, frequency or hematuria  NEUROLOGICAL: No stroke like symptoms  SKIN: No rashes    TELEMETRY Personally reviewed: VPaced with underlying afib, 7 beats WCT   	  MEDICATIONS:  atenolol  Tablet 50 milliGRAM(s) Oral daily  verapamil  milliGRAM(s) Oral daily        PHYSICAL EXAM:  T(C): 36.3 (03-16-24 @ 11:12), Max: 36.9 (03-15-24 @ 21:37)  HR: 65 (03-16-24 @ 11:12) (65 - 83)  BP: 123/78 (03-16-24 @ 11:12) (115/81 - 167/99)  RR: 18 (03-16-24 @ 11:12) (18 - 18)  SpO2: 96% (03-16-24 @ 11:12) (96% - 97%)  Wt(kg): --  I&O's Summary    15 Mar 2024 07:01  -  16 Mar 2024 07:00  --------------------------------------------------------  IN: 1240 mL / OUT: 3 mL / NET: 1237 mL    16 Mar 2024 07:01  -  16 Mar 2024 15:37  --------------------------------------------------------  IN: 560 mL / OUT: 0 mL / NET: 560 mL          Appearance: In no distress	  HEENT:    PERRL, EOMI	  Cardiovascular:  S1 S2, No JVD  Respiratory: Lungs clear to auscultation	  Gastrointestinal:  Soft, Non-tender, + BS	  Vascularature:  No edema of LE  Psychiatric: Appropriate affect   Neuro: no acute focal deficits           03-16    137  |  99  |  14  ----------------------------<  252<H>  3.8   |  27  |  0.77    Ca    9.8      16 Mar 2024 10:25  Phos  4.4     03-16  Mg     1.7     03-16          Labs personally reviewed      ASSESSMENT/PLAN: 	  68-year-old male with past medical history of A-fib (on Xarelto) status post defibrillator placement, diabetes (on Ozempic) p/w Confusion, forgetfulness.   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 Patient was sick with fever couple of days ago, confused,     1. Atypical CP  - non exertional CP  - Trop negative x 2   - patient with risk factors: Former smoker, HTN, DM  - TTE shows non-obstructive HCM  - Chest pain now resolved       2. Afib   - [id number] score 4  - check TSH  - c/w home Xarelto   - c/w Atenolol and Verapamil 120mg daily    3. s/p ICD  - ICD compatible with MRI  - recently interrogated IH, unremarkable as above   - 3/16 7 beats WCT, then back to pacing - asymptomatic, check bmp      4. DM  - A1c 11.3  - c/w Insulin as order  - Endo recs appreciated given A1c above 8     5. DVT prophylactic   - c/w Xarelto             Iolani Behrblalitha, AG-NP   Girma Mix DO [facility]  Cardiovascular Medicine  [address], [address]  Available through call or text on [employer]  Office: [phone]

## 2024-03-16 NOTE — PROVIDER CONTACT NOTE (OTHER) - BACKGROUND
Dx; AMS, chest pain, hyponatremia  PMH: DM, AICD placement, chronic AF
here for AMS. hx of af,dm, aicd. TTE w HOCM

## 2024-03-16 NOTE — PROVIDER CONTACT NOTE (OTHER) - SITUATION
pt had 7 beats of WCT on tele then back to pacing. no AM labs ordered.
permission to change AFib alert to informational

## 2024-03-16 NOTE — DISCHARGE NOTE PROVIDER - CARE PROVIDERS DIRECT ADDRESSES
[email or web address],[email or web address],DirectAddress_Unknown

## 2024-03-16 NOTE — PROGRESS NOTE ADULT - REASON FOR ADMISSION
AMS x 2 days

## 2024-03-16 NOTE — DISCHARGE NOTE PROVIDER - CARE PROVIDER_API CALL
Girma Mix  Cardiovascular Disease  [address], [address]  [place] [address]  Phone: [phone]  Fax: [phone]  Follow Up Time: 1 week    Corby Brady  [id number] [place] [address]  Phone: [phone]  Fax: [phone]  Follow Up Time: 1-3 days    Vanda Bright  Endocrinology/Metab/Diabetes  [address] [place] [address], [address]  [place] [address]  Phone: [phone]  Fax: [phone]  Follow Up Time: 1 week

## 2024-03-16 NOTE — DISCHARGE NOTE PROVIDER - HOSPITAL COURSE
HPI:  68-year-old male with past medical history of A-fib (on Xarelto) status post defibrillator placement, diabetes (on Ozempic) p/w Confusion, forgetfulness.   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 Patient was sick with fever couple of days ago, confused,     In the ED, the patient is complaining of sharp localized chest pain over the left side of his chest.  Denies fevers, chills, recent illness, difficulty breathing, abdominal pain, nausea/vomiting/diarrhea, urinary symptoms, rashes or recent known head trauma.    In the ed patient had CT head no acute infract   Patient  noted to have Afib with rvr s/p Cardizem po and iv doses  (12 Mar 2024 19:36)    Hospital Course:  68-year-old male with past medical history of A-fib (on Xarelto) status post defibrillator placement, diabetes (on Ozempic) p/w Confusion, forgetfulness.   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 Patient was sick with fever couple of days ago, confused.   CT Head and MRI Head negative for acute infarct.   Seen by cardiology. ICD interrogated showing atrial fibrillation with VHR 60-70's. TTE shows non-obstructive HCM. Chest pain now resolved. For atrial fibrillation, recommended to continue on Xarelto, atenolol and verapamil. Also with 7 beats WCT on tele, continue on atenolol and f/u with cardiology after discharge.   Also seen by endocrine for uncontrolled T2DM (hgb a1c 11.3). Recommended to start Lantus 18 units at night (needs prescription for pens and pen needles), stop Ozempic since not tolerating, resume Glimepiride and Janumet.     Active or Pending Issues Requiring Follow-up:  Please f/u outpatient with PCP, cardiology and endocrinology.     Advanced Directives:   [x] Full code  [ ] DNR  [ ] Hospice    Discharge Diagnoses:  atypical chest pain   atrial fibrillation  DMT2

## 2024-03-16 NOTE — PROGRESS NOTE ADULT - SUBJECTIVE AND OBJECTIVE BOX
[facility] Progress Note    MAR, chart notes, fingersticks and labs reviewed    Objective  Vital Signs  T(C): 36.3 (03-16-24 @ 11:12), Max: 36.9 (03-15-24 @ 21:37)  HR: 65 (03-16-24 @ 11:12) (65 - 83)  BP: 123/78 (03-16-24 @ 11:12) (115/81 - 167/99)  RR: 18 (03-16-24 @ 11:12) (18 - 18)  SpO2: 96% (03-16-24 @ 11:12) (96% - 97%)    Physical Exam  Gen: NAD, resting comfortably  HEENT: NC/AT  Neck: supple  Chest: breathing comfortably  Heart: +s1 +s2    Medications  atenolol  Tablet 50 milliGRAM(s) Oral daily  chlorhexidine 2% Cloths 1 Application(s) Topical <User Schedule>  dextrose 5%. 1000 milliLiter(s) IV Continuous <Continuous>  dextrose 5%. 1000 milliLiter(s) IV Continuous <Continuous>  dextrose 50% Injectable 12.5 Gram(s) IV Push once  dextrose 50% Injectable 25 Gram(s) IV Push once  dextrose 50% Injectable 25 Gram(s) IV Push once  dextrose Oral Gel 15 Gram(s) Oral once PRN  glucagon  Injectable 1 milliGRAM(s) IntraMuscular once  influenza  Vaccine (HIGH DOSE) 0.7 milliLiter(s) IntraMuscular once  insulin glargine Injectable (LANTUS) 18 Unit(s) SubCutaneous at bedtime  insulin lispro (ADMELOG) corrective regimen sliding scale   SubCutaneous three times a day before meals  insulin lispro (ADMELOG) corrective regimen sliding scale   SubCutaneous at bedtime  insulin lispro Injectable (ADMELOG) 5 Unit(s) SubCutaneous before breakfast  insulin lispro Injectable (ADMELOG) 8 Unit(s) SubCutaneous before lunch  insulin lispro Injectable (ADMELOG) 5 Unit(s) SubCutaneous before dinner  mupirocin 2% Nasal 1 Application(s) Both Nostrils two times a day  pantoprazole    Tablet 40 milliGRAM(s) Oral before breakfast  rivaroxaban 20 milliGRAM(s) Oral with dinner  verapamil  milliGRAM(s) Oral daily    Pertinent labs/Imaging  POCT Blood Glucose.: 193 mg/dL (03-16-24 @ 11:24)  POCT Blood Glucose.: 236 mg/dL (03-16-24 @ 07:38)  POCT Blood Glucose.: 270 mg/dL (03-15-24 @ 21:42)  POCT Blood Glucose.: 350 mg/dL (03-15-24 @ 16:53)    eGFR: 99 mL/min/1.73m2 (03-16-24 @ 10:25)  eGFR: 98 mL/min/1.73m2 (03-15-24 @ 07:13)

## 2025-02-17 NOTE — PATIENT PROFILE ADULT - TOBACCO USE
----- Message from Aniyah sent at 2/17/2025  3:48 PM CST -----  Type: Needs Medical AdviceWho Called:  pt Symptoms (please be specific):  having trouble doing virtual visitHow long has patient had these symptoms:  Pharmacy name and phone #: [phone] [place] [facility] [id number] [facility] [address]Xwfhw: [phone] Fax: [phone] Best Call Back Number: [phone] Information:  Please call back to advise. Thanks!   Never smoker

## 2025-08-27 ENCOUNTER — APPOINTMENT (OUTPATIENT)
Dept: ELECTROPHYSIOLOGY | Facility: CLINIC | Age: 70
End: 2025-08-27